# Patient Record
Sex: MALE | Race: WHITE | Employment: OTHER | URBAN - METROPOLITAN AREA
[De-identification: names, ages, dates, MRNs, and addresses within clinical notes are randomized per-mention and may not be internally consistent; named-entity substitution may affect disease eponyms.]

---

## 2018-08-03 ENCOUNTER — HOSPITAL ENCOUNTER (EMERGENCY)
Age: 83
Discharge: HOME | End: 2018-08-03
Payer: MEDICARE

## 2018-08-03 ENCOUNTER — APPOINTMENT (OUTPATIENT)
Dept: GENERAL RADIOLOGY | Age: 83
End: 2018-08-03
Payer: MEDICARE

## 2018-08-03 VITALS
BODY MASS INDEX: 25.83 KG/M2 | OXYGEN SATURATION: 94 % | DIASTOLIC BLOOD PRESSURE: 75 MMHG | SYSTOLIC BLOOD PRESSURE: 188 MMHG | HEART RATE: 65 BPM | RESPIRATION RATE: 16 BRPM | WEIGHT: 180 LBS | TEMPERATURE: 98 F

## 2018-08-03 DIAGNOSIS — S52.502A CLOSED FRACTURE OF DISTAL END OF LEFT RADIUS, UNSPECIFIED FRACTURE MORPHOLOGY, INITIAL ENCOUNTER: Primary | ICD-10-CM

## 2018-08-03 DIAGNOSIS — M25.532 PAIN AND SWELLING OF LEFT WRIST: ICD-10-CM

## 2018-08-03 DIAGNOSIS — M25.432 PAIN AND SWELLING OF LEFT WRIST: ICD-10-CM

## 2018-08-03 PROCEDURE — 99283 EMERGENCY DEPT VISIT LOW MDM: CPT

## 2018-08-03 PROCEDURE — 6370000000 HC RX 637 (ALT 250 FOR IP): Performed by: PHYSICIAN ASSISTANT

## 2018-08-03 PROCEDURE — 73110 X-RAY EXAM OF WRIST: CPT

## 2018-08-03 PROCEDURE — 4500000023 HC ED LEVEL 3 PROCEDURE

## 2018-08-03 RX ORDER — ACETAMINOPHEN 325 MG/1
650 TABLET ORAL ONCE
Status: COMPLETED | OUTPATIENT
Start: 2018-08-03 | End: 2018-08-03

## 2018-08-03 RX ORDER — HYDROCODONE BITARTRATE AND ACETAMINOPHEN 5; 325 MG/1; MG/1
1 TABLET ORAL EVERY 6 HOURS PRN
Qty: 10 TABLET | Refills: 0 | Status: SHIPPED | OUTPATIENT
Start: 2018-08-03 | End: 2018-08-10

## 2018-08-03 RX ADMIN — ACETAMINOPHEN 650 MG: 325 TABLET, FILM COATED ORAL at 10:37

## 2018-08-03 ASSESSMENT — PAIN SCALES - GENERAL
PAINLEVEL_OUTOF10: 7
PAINLEVEL_OUTOF10: 7

## 2018-08-03 NOTE — ED NOTES
Pt has been seen and assessed per [facility] .       Donnie Parr RN  08/03/18 1716

## 2018-08-03 NOTE — ED PROVIDER NOTES
(!) 188/75, pulse 65, temperature 98 °F (36.7 °C), temperature source Oral, resp. rate 16, weight 180 lb (81.6 kg), SpO2 94 %. PATIENT REFERRED TO:  Kashif Campuzano MD  [place] [address]    Schedule an appointment as soon as possible for a visit           DISPOSITION  Patient was discharged to home in good condition.           Gila Wellingtonma  08/03/18 1021

## 2018-08-23 DIAGNOSIS — M25.532 LEFT WRIST PAIN: Primary | ICD-10-CM

## 2018-09-13 DIAGNOSIS — M25.532 LEFT WRIST PAIN: Primary | ICD-10-CM

## 2021-03-31 ENCOUNTER — APPOINTMENT (OUTPATIENT)
Dept: CT IMAGING | Age: 86
DRG: 871 | End: 2021-03-31
Payer: MEDICARE

## 2021-03-31 ENCOUNTER — APPOINTMENT (OUTPATIENT)
Dept: GENERAL RADIOLOGY | Age: 86
DRG: 871 | End: 2021-03-31
Payer: MEDICARE

## 2021-03-31 ENCOUNTER — HOSPITAL ENCOUNTER (INPATIENT)
Age: 86
LOS: 6 days | Discharge: HOSPICE | DRG: 871 | End: 2021-04-06
Attending: EMERGENCY MEDICINE | Admitting: INTERNAL MEDICINE
Payer: MEDICARE
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
## 2021-03-31 DIAGNOSIS — R79.89 ELEVATED BRAIN NATRIURETIC PEPTIDE (BNP) LEVEL: ICD-10-CM

## 2021-03-31 DIAGNOSIS — R77.8 ELEVATED TROPONIN: ICD-10-CM

## 2021-03-31 DIAGNOSIS — J96.01 ACUTE RESPIRATORY FAILURE WITH HYPOXIA (HCC): Primary | ICD-10-CM

## 2021-03-31 DIAGNOSIS — N17.9 AKI (ACUTE KIDNEY INJURY) (HCC): ICD-10-CM

## 2021-03-31 PROBLEM — I34.0 SEVERE MITRAL REGURGITATION: Status: ACTIVE | Noted: 2021-03-31

## 2021-03-31 PROBLEM — I50.23 ACUTE ON CHRONIC SYSTOLIC HEART FAILURE (HCC): Status: ACTIVE | Noted: 2021-03-31

## 2021-03-31 PROBLEM — I48.20 CHRONIC ATRIAL FIBRILLATION (HCC): Status: ACTIVE | Noted: 2021-03-31

## 2021-03-31 PROBLEM — I50.22 CHRONIC SYSTOLIC (CONGESTIVE) HEART FAILURE (HCC): Status: ACTIVE | Noted: 2021-03-31

## 2021-03-31 LAB
A/G RATIO: 1.3 (ref 1.1–2.2)
ALBUMIN SERPL-MCNC: 3.4 G/DL (ref 3.4–5)
ALP BLD-CCNC: 237 U/L (ref 40–129)
ALT SERPL-CCNC: 37 U/L (ref 10–40)
ANION GAP SERPL CALCULATED.3IONS-SCNC: 12 MMOL/L (ref 3–16)
AST SERPL-CCNC: 46 U/L (ref 15–37)
BASE EXCESS VENOUS: -3.3 MMOL/L (ref -3–3)
BASOPHILS ABSOLUTE: 0.1 K/UL (ref 0–0.2)
BASOPHILS RELATIVE PERCENT: 0.4 %
BILIRUB SERPL-MCNC: 2.9 MG/DL (ref 0–1)
BUN BLDV-MCNC: 52 MG/DL (ref 7–20)
CALCIUM SERPL-MCNC: 8.5 MG/DL (ref 8.3–10.6)
CARBOXYHEMOGLOBIN: 1.6 % (ref 0–1.5)
CHLORIDE BLD-SCNC: 105 MMOL/L (ref 99–110)
CO2: 20 MMOL/L (ref 21–32)
CREAT SERPL-MCNC: 2.3 MG/DL (ref 0.8–1.3)
EOSINOPHILS ABSOLUTE: 0 K/UL (ref 0–0.6)
EOSINOPHILS RELATIVE PERCENT: 0.1 %
GFR AFRICAN AMERICAN: 33
GFR NON-AFRICAN AMERICAN: 27
GLOBULIN: 2.7 G/DL
GLUCOSE BLD-MCNC: 113 MG/DL (ref 70–99)
HCO3 VENOUS: 20.4 MMOL/L (ref 23–29)
HCT VFR BLD CALC: 44.3 % (ref 40.5–52.5)
HEMOGLOBIN: 15 G/DL (ref 13.5–17.5)
INR BLD: 4.08 (ref 0.86–1.14)
LACTIC ACID: 2 MMOL/L (ref 0.4–2)
LYMPHOCYTES ABSOLUTE: 1.2 K/UL (ref 1–5.1)
LYMPHOCYTES RELATIVE PERCENT: 8.2 %
MCH RBC QN AUTO: 35.3 PG (ref 26–34)
MCHC RBC AUTO-ENTMCNC: 33.9 G/DL (ref 31–36)
MCV RBC AUTO: 104.2 FL (ref 80–100)
METHEMOGLOBIN VENOUS: 0.3 %
MONOCYTES ABSOLUTE: 1.5 K/UL (ref 0–1.3)
MONOCYTES RELATIVE PERCENT: 10.2 %
NEUTROPHILS ABSOLUTE: 11.7 K/UL (ref 1.7–7.7)
NEUTROPHILS RELATIVE PERCENT: 81.1 %
O2 CONTENT, VEN: 16 VOL %
O2 SAT, VEN: 70 %
O2 THERAPY: ABNORMAL
PCO2, VEN: 33.5 MMHG (ref 40–50)
PDW BLD-RTO: 14.3 % (ref 12.4–15.4)
PH VENOUS: 7.4 (ref 7.35–7.45)
PLATELET # BLD: 199 K/UL (ref 135–450)
PMV BLD AUTO: 9.6 FL (ref 5–10.5)
PO2, VEN: 36.3 MMHG (ref 25–40)
POTASSIUM SERPL-SCNC: 4.6 MMOL/L (ref 3.5–5.1)
PRO-BNP: ABNORMAL PG/ML (ref 0–449)
PROCALCITONIN: 0.47 NG/ML (ref 0–0.15)
PROTHROMBIN TIME: 48 SEC (ref 10–13.2)
RBC # BLD: 4.25 M/UL (ref 4.2–5.9)
SARS-COV-2, NAAT: NOT DETECTED
SODIUM BLD-SCNC: 137 MMOL/L (ref 136–145)
TCO2 CALC VENOUS: 22 MMOL/L
TOTAL CK: 188 U/L (ref 39–308)
TOTAL PROTEIN: 6.1 G/DL (ref 6.4–8.2)
TROPONIN: 0.04 NG/ML
WBC # BLD: 14.4 K/UL (ref 4–11)

## 2021-03-31 PROCEDURE — 96368 THER/DIAG CONCURRENT INF: CPT

## 2021-03-31 PROCEDURE — 96365 THER/PROPH/DIAG IV INF INIT: CPT

## 2021-03-31 PROCEDURE — 99285 EMERGENCY DEPT VISIT HI MDM: CPT

## 2021-03-31 PROCEDURE — 87635 SARS-COV-2 COVID-19 AMP PRB: CPT

## 2021-03-31 PROCEDURE — 83605 ASSAY OF LACTIC ACID: CPT

## 2021-03-31 PROCEDURE — 2580000003 HC RX 258: Performed by: EMERGENCY MEDICINE

## 2021-03-31 PROCEDURE — 82803 BLOOD GASES ANY COMBINATION: CPT

## 2021-03-31 PROCEDURE — 85025 COMPLETE CBC W/AUTO DIFF WBC: CPT

## 2021-03-31 PROCEDURE — 36415 COLL VENOUS BLD VENIPUNCTURE: CPT

## 2021-03-31 PROCEDURE — 82550 ASSAY OF CK (CPK): CPT

## 2021-03-31 PROCEDURE — 74176 CT ABD & PELVIS W/O CONTRAST: CPT

## 2021-03-31 PROCEDURE — 83880 ASSAY OF NATRIURETIC PEPTIDE: CPT

## 2021-03-31 PROCEDURE — 85610 PROTHROMBIN TIME: CPT

## 2021-03-31 PROCEDURE — 93005 ELECTROCARDIOGRAM TRACING: CPT | Performed by: EMERGENCY MEDICINE

## 2021-03-31 PROCEDURE — 94761 N-INVAS EAR/PLS OXIMETRY MLT: CPT

## 2021-03-31 PROCEDURE — 84145 PROCALCITONIN (PCT): CPT

## 2021-03-31 PROCEDURE — 80053 COMPREHEN METABOLIC PANEL: CPT

## 2021-03-31 PROCEDURE — 6360000002 HC RX W HCPCS: Performed by: EMERGENCY MEDICINE

## 2021-03-31 PROCEDURE — 87040 BLOOD CULTURE FOR BACTERIA: CPT

## 2021-03-31 PROCEDURE — 2700000000 HC OXYGEN THERAPY PER DAY

## 2021-03-31 PROCEDURE — 84484 ASSAY OF TROPONIN QUANT: CPT

## 2021-03-31 PROCEDURE — 2060000000 HC ICU INTERMEDIATE R&B

## 2021-03-31 PROCEDURE — 71045 X-RAY EXAM CHEST 1 VIEW: CPT

## 2021-03-31 RX ORDER — FUROSEMIDE 10 MG/ML
40 INJECTION INTRAMUSCULAR; INTRAVENOUS 2 TIMES DAILY
Status: DISCONTINUED | OUTPATIENT
Start: 2021-03-31 | End: 2021-04-01

## 2021-03-31 RX ORDER — SODIUM CHLORIDE 9 MG/ML
1000 INJECTION, SOLUTION INTRAVENOUS CONTINUOUS
Status: DISCONTINUED | OUTPATIENT
Start: 2021-03-31 | End: 2021-04-01

## 2021-03-31 RX ADMIN — SODIUM CHLORIDE 1000 ML: 9 INJECTION, SOLUTION INTRAVENOUS at 23:34

## 2021-03-31 RX ADMIN — CEFEPIME HYDROCHLORIDE 1000 MG: 1 INJECTION, POWDER, FOR SOLUTION INTRAMUSCULAR; INTRAVENOUS at 23:03

## 2021-03-31 RX ADMIN — VANCOMYCIN HYDROCHLORIDE 1500 MG: 10 INJECTION, POWDER, LYOPHILIZED, FOR SOLUTION INTRAVENOUS at 23:33

## 2021-03-31 RX ADMIN — SODIUM CHLORIDE 1000 ML: 9 INJECTION, SOLUTION INTRAVENOUS at 22:07

## 2021-03-31 RX ADMIN — SODIUM CHLORIDE 1000 ML: 9 INJECTION, SOLUTION INTRAVENOUS at 20:55

## 2021-03-31 ASSESSMENT — PAIN DESCRIPTION - LOCATION: LOCATION: HIP

## 2021-03-31 ASSESSMENT — PAIN DESCRIPTION - ORIENTATION: ORIENTATION: LEFT

## 2021-03-31 ASSESSMENT — PAIN SCALES - GENERAL: PAINLEVEL_OUTOF10: 7

## 2021-03-31 ASSESSMENT — PAIN DESCRIPTION - PROGRESSION: CLINICAL_PROGRESSION: NOT CHANGED

## 2021-04-01 PROBLEM — E05.90 HYPERTHYROIDISM: Status: ACTIVE | Noted: 2021-04-01

## 2021-04-01 PROBLEM — I44.7 LBBB (LEFT BUNDLE BRANCH BLOCK): Status: ACTIVE | Noted: 2021-04-01

## 2021-04-01 PROBLEM — R79.1 SUPRATHERAPEUTIC INR: Status: ACTIVE | Noted: 2021-04-01

## 2021-04-01 PROBLEM — J96.01 ACUTE RESPIRATORY FAILURE WITH HYPOXIA (HCC): Status: ACTIVE | Noted: 2021-04-01

## 2021-04-01 PROBLEM — Z79.01 CHRONIC ANTICOAGULATION: Status: ACTIVE | Noted: 2021-04-01

## 2021-04-01 PROBLEM — E80.6 HYPERBILIRUBINEMIA: Status: ACTIVE | Noted: 2021-04-01

## 2021-04-01 LAB
A/G RATIO: 1.1 (ref 1.1–2.2)
ALBUMIN SERPL-MCNC: 3.4 G/DL (ref 3.4–5)
ALP BLD-CCNC: 225 U/L (ref 40–129)
ALT SERPL-CCNC: 37 U/L (ref 10–40)
ANION GAP SERPL CALCULATED.3IONS-SCNC: 15 MMOL/L (ref 3–16)
AST SERPL-CCNC: 48 U/L (ref 15–37)
BASE EXCESS ARTERIAL: -6.2 MMOL/L (ref -3–3)
BASE EXCESS ARTERIAL: -7.7 MMOL/L (ref -3–3)
BASOPHILS ABSOLUTE: 0 K/UL (ref 0–0.2)
BASOPHILS RELATIVE PERCENT: 0.3 %
BILIRUB SERPL-MCNC: 2.7 MG/DL (ref 0–1)
BUN BLDV-MCNC: 55 MG/DL (ref 7–20)
CALCIUM SERPL-MCNC: 8.7 MG/DL (ref 8.3–10.6)
CARBOXYHEMOGLOBIN ARTERIAL: 0.3 % (ref 0–1.5)
CARBOXYHEMOGLOBIN ARTERIAL: 0.3 % (ref 0–1.5)
CHLORIDE BLD-SCNC: 106 MMOL/L (ref 99–110)
CO2: 18 MMOL/L (ref 21–32)
CREAT SERPL-MCNC: 2.3 MG/DL (ref 0.8–1.3)
EKG ATRIAL RATE: 133 BPM
EKG DIAGNOSIS: NORMAL
EKG Q-T INTERVAL: 436 MS
EKG QRS DURATION: 176 MS
EKG QTC CALCULATION (BAZETT): 576 MS
EKG R AXIS: 8 DEGREES
EKG T AXIS: 206 DEGREES
EKG VENTRICULAR RATE: 105 BPM
EOSINOPHILS ABSOLUTE: 0 K/UL (ref 0–0.6)
EOSINOPHILS RELATIVE PERCENT: 0.1 %
FERRITIN: 920.5 NG/ML (ref 30–400)
FOLATE: 10.83 NG/ML (ref 4.78–24.2)
GFR AFRICAN AMERICAN: 33
GFR NON-AFRICAN AMERICAN: 27
GLOBULIN: 3.1 G/DL
GLUCOSE BLD-MCNC: 100 MG/DL (ref 70–99)
HCO3 ARTERIAL: 15.4 MMOL/L (ref 21–29)
HCO3 ARTERIAL: 16.8 MMOL/L (ref 21–29)
HCT VFR BLD CALC: 44.7 % (ref 40.5–52.5)
HEMOGLOBIN, ART, EXTENDED: 15.9 G/DL (ref 13.5–17.5)
HEMOGLOBIN, ART, EXTENDED: 18.6 G/DL (ref 13.5–17.5)
HEMOGLOBIN: 14.8 G/DL (ref 13.5–17.5)
IMMATURE RETIC FRACT: 0.5 (ref 0.21–0.37)
INR BLD: 4.21 (ref 0.86–1.14)
IRON SATURATION: 32 % (ref 20–50)
IRON: 52 UG/DL (ref 59–158)
LACTATE DEHYDROGENASE: 350 U/L (ref 100–190)
LYMPHOCYTES ABSOLUTE: 0.9 K/UL (ref 1–5.1)
LYMPHOCYTES RELATIVE PERCENT: 6.7 %
MAGNESIUM: 2.6 MG/DL (ref 1.8–2.4)
MCH RBC QN AUTO: 34.8 PG (ref 26–34)
MCHC RBC AUTO-ENTMCNC: 33 G/DL (ref 31–36)
MCV RBC AUTO: 105.5 FL (ref 80–100)
METHEMOGLOBIN ARTERIAL: 0.4 %
METHEMOGLOBIN ARTERIAL: 0.7 %
MONOCYTES ABSOLUTE: 1.2 K/UL (ref 0–1.3)
MONOCYTES RELATIVE PERCENT: 8.9 %
NEUTROPHILS ABSOLUTE: 11.5 K/UL (ref 1.7–7.7)
NEUTROPHILS RELATIVE PERCENT: 84 %
O2 CONTENT ARTERIAL: 21 ML/DL
O2 CONTENT ARTERIAL: 25 ML/DL
O2 SAT, ARTERIAL: 94.8 %
O2 SAT, ARTERIAL: 95.7 %
O2 THERAPY: ABNORMAL
O2 THERAPY: ABNORMAL
PCO2 ARTERIAL: 26.2 MMHG (ref 35–45)
PCO2 ARTERIAL: 28.4 MMHG (ref 35–45)
PDW BLD-RTO: 14.2 % (ref 12.4–15.4)
PH ARTERIAL: 7.39 (ref 7.35–7.45)
PH ARTERIAL: 7.39 (ref 7.35–7.45)
PLATELET # BLD: 202 K/UL (ref 135–450)
PMV BLD AUTO: 9.5 FL (ref 5–10.5)
PO2 ARTERIAL: 77.5 MMHG (ref 75–108)
PO2 ARTERIAL: 80.5 MMHG (ref 75–108)
POTASSIUM SERPL-SCNC: 4.6 MMOL/L (ref 3.5–5.1)
PROCALCITONIN: 0.56 NG/ML (ref 0–0.15)
PROCALCITONIN: 0.65 NG/ML (ref 0–0.15)
PROTHROMBIN TIME: 49.6 SEC (ref 10–13.2)
RBC # BLD: 4.24 M/UL (ref 4.2–5.9)
RETICULOCYTE ABSOLUTE COUNT: 0.04 M/UL
RETICULOCYTE COUNT PCT: 0.98 % (ref 0.5–2.18)
SARS-COV-2: NOT DETECTED
SODIUM BLD-SCNC: 139 MMOL/L (ref 136–145)
T4 FREE: 2 NG/DL (ref 0.9–1.8)
TCO2 ARTERIAL: 16.2 MMOL/L
TCO2 ARTERIAL: 17.6 MMOL/L
TOTAL IRON BINDING CAPACITY: 163 UG/DL (ref 260–445)
TOTAL PROTEIN: 6.5 G/DL (ref 6.4–8.2)
TROPONIN: 0.03 NG/ML
TROPONIN: 0.04 NG/ML
TROPONIN: 0.04 NG/ML
TSH REFLEX FT4: 1.38 UIU/ML (ref 0.27–4.2)
TSH SERPL DL<=0.05 MIU/L-ACNC: 1.41 UIU/ML (ref 0.27–4.2)
VITAMIN B-12: 1178 PG/ML (ref 211–911)
WBC # BLD: 13.7 K/UL (ref 4–11)

## 2021-04-01 PROCEDURE — 85025 COMPLETE CBC W/AUTO DIFF WBC: CPT

## 2021-04-01 PROCEDURE — 85045 AUTOMATED RETICULOCYTE COUNT: CPT

## 2021-04-01 PROCEDURE — 2580000003 HC RX 258: Performed by: INTERNAL MEDICINE

## 2021-04-01 PROCEDURE — 80053 COMPREHEN METABOLIC PANEL: CPT

## 2021-04-01 PROCEDURE — 99223 1ST HOSP IP/OBS HIGH 75: CPT | Performed by: INTERNAL MEDICINE

## 2021-04-01 PROCEDURE — 6360000002 HC RX W HCPCS: Performed by: INTERNAL MEDICINE

## 2021-04-01 PROCEDURE — 2500000003 HC RX 250 WO HCPCS: Performed by: INTERNAL MEDICINE

## 2021-04-01 PROCEDURE — 82728 ASSAY OF FERRITIN: CPT

## 2021-04-01 PROCEDURE — 82746 ASSAY OF FOLIC ACID SERUM: CPT

## 2021-04-01 PROCEDURE — 85610 PROTHROMBIN TIME: CPT

## 2021-04-01 PROCEDURE — 2700000000 HC OXYGEN THERAPY PER DAY

## 2021-04-01 PROCEDURE — 82607 VITAMIN B-12: CPT

## 2021-04-01 PROCEDURE — 36600 WITHDRAWAL OF ARTERIAL BLOOD: CPT

## 2021-04-01 PROCEDURE — 99291 CRITICAL CARE FIRST HOUR: CPT | Performed by: INTERNAL MEDICINE

## 2021-04-01 PROCEDURE — 84443 ASSAY THYROID STIM HORMONE: CPT

## 2021-04-01 PROCEDURE — U0005 INFEC AGEN DETEC AMPLI PROBE: HCPCS

## 2021-04-01 PROCEDURE — 84484 ASSAY OF TROPONIN QUANT: CPT

## 2021-04-01 PROCEDURE — 94761 N-INVAS EAR/PLS OXIMETRY MLT: CPT

## 2021-04-01 PROCEDURE — 94660 CPAP INITIATION&MGMT: CPT

## 2021-04-01 PROCEDURE — U0003 INFECTIOUS AGENT DETECTION BY NUCLEIC ACID (DNA OR RNA); SEVERE ACUTE RESPIRATORY SYNDROME CORONAVIRUS 2 (SARS-COV-2) (CORONAVIRUS DISEASE [COVID-19]), AMPLIFIED PROBE TECHNIQUE, MAKING USE OF HIGH THROUGHPUT TECHNOLOGIES AS DESCRIBED BY CMS-2020-01-R: HCPCS

## 2021-04-01 PROCEDURE — 83615 LACTATE (LD) (LDH) ENZYME: CPT

## 2021-04-01 PROCEDURE — 83540 ASSAY OF IRON: CPT

## 2021-04-01 PROCEDURE — 84439 ASSAY OF FREE THYROXINE: CPT

## 2021-04-01 PROCEDURE — 2060000000 HC ICU INTERMEDIATE R&B

## 2021-04-01 PROCEDURE — 36415 COLL VENOUS BLD VENIPUNCTURE: CPT

## 2021-04-01 PROCEDURE — 93005 ELECTROCARDIOGRAM TRACING: CPT | Performed by: NURSE PRACTITIONER

## 2021-04-01 PROCEDURE — 82803 BLOOD GASES ANY COMBINATION: CPT

## 2021-04-01 PROCEDURE — 84145 PROCALCITONIN (PCT): CPT

## 2021-04-01 PROCEDURE — 83550 IRON BINDING TEST: CPT

## 2021-04-01 PROCEDURE — 93010 ELECTROCARDIOGRAM REPORT: CPT | Performed by: INTERNAL MEDICINE

## 2021-04-01 PROCEDURE — 83735 ASSAY OF MAGNESIUM: CPT

## 2021-04-01 RX ORDER — SODIUM CHLORIDE 0.9 % (FLUSH) 0.9 %
10 SYRINGE (ML) INJECTION PRN
Status: DISCONTINUED | OUTPATIENT
Start: 2021-04-01 | End: 2021-04-06 | Stop reason: HOSPADM

## 2021-04-01 RX ORDER — AMIODARONE HYDROCHLORIDE 200 MG/1
200 TABLET ORAL DAILY
Status: DISCONTINUED | OUTPATIENT
Start: 2021-04-01 | End: 2021-04-04

## 2021-04-01 RX ORDER — CALCITRIOL 0.25 UG/1
0.25 CAPSULE, LIQUID FILLED ORAL DAILY
Status: DISCONTINUED | OUTPATIENT
Start: 2021-04-01 | End: 2021-04-06 | Stop reason: HOSPADM

## 2021-04-01 RX ORDER — ACETAMINOPHEN 325 MG/1
650 TABLET ORAL EVERY 6 HOURS PRN
Status: DISCONTINUED | OUTPATIENT
Start: 2021-04-01 | End: 2021-04-06 | Stop reason: HOSPADM

## 2021-04-01 RX ORDER — WARFARIN SODIUM 2.5 MG/1
2.5 TABLET ORAL DAILY
Status: DISCONTINUED | OUTPATIENT
Start: 2021-04-01 | End: 2021-04-01

## 2021-04-01 RX ORDER — PROMETHAZINE HYDROCHLORIDE 25 MG/1
12.5 TABLET ORAL EVERY 6 HOURS PRN
Status: DISCONTINUED | OUTPATIENT
Start: 2021-04-01 | End: 2021-04-05 | Stop reason: ALTCHOICE

## 2021-04-01 RX ORDER — SODIUM CHLORIDE 9 MG/ML
25 INJECTION, SOLUTION INTRAVENOUS PRN
Status: DISCONTINUED | OUTPATIENT
Start: 2021-04-01 | End: 2021-04-06 | Stop reason: HOSPADM

## 2021-04-01 RX ORDER — METHIMAZOLE 5 MG/1
10 TABLET ORAL 3 TIMES DAILY
Status: DISCONTINUED | OUTPATIENT
Start: 2021-04-01 | End: 2021-04-06 | Stop reason: HOSPADM

## 2021-04-01 RX ORDER — ACETAMINOPHEN 650 MG/1
650 SUPPOSITORY RECTAL EVERY 6 HOURS PRN
Status: DISCONTINUED | OUTPATIENT
Start: 2021-04-01 | End: 2021-04-06 | Stop reason: HOSPADM

## 2021-04-01 RX ORDER — ONDANSETRON 2 MG/ML
4 INJECTION INTRAMUSCULAR; INTRAVENOUS EVERY 6 HOURS PRN
Status: DISCONTINUED | OUTPATIENT
Start: 2021-04-01 | End: 2021-04-05 | Stop reason: ALTCHOICE

## 2021-04-01 RX ORDER — ATORVASTATIN CALCIUM 10 MG/1
20 TABLET, FILM COATED ORAL DAILY
Status: DISCONTINUED | OUTPATIENT
Start: 2021-04-01 | End: 2021-04-06 | Stop reason: HOSPADM

## 2021-04-01 RX ORDER — LISINOPRIL 20 MG/1
20 TABLET ORAL EVERY EVENING
Status: CANCELLED | OUTPATIENT
Start: 2021-04-02

## 2021-04-01 RX ORDER — LANOLIN ALCOHOL/MO/W.PET/CERES
3 CREAM (GRAM) TOPICAL NIGHTLY PRN
Status: DISCONTINUED | OUTPATIENT
Start: 2021-04-01 | End: 2021-04-06 | Stop reason: HOSPADM

## 2021-04-01 RX ORDER — CHLOROTHIAZIDE SODIUM 500 MG/1
500 INJECTION INTRAVENOUS ONCE
Status: COMPLETED | OUTPATIENT
Start: 2021-04-01 | End: 2021-04-01

## 2021-04-01 RX ADMIN — DILTIAZEM HYDROCHLORIDE 5 MG/HR: 5 INJECTION INTRAVENOUS at 10:49

## 2021-04-01 RX ADMIN — FUROSEMIDE 40 MG: 10 INJECTION, SOLUTION INTRAMUSCULAR; INTRAVENOUS at 00:00

## 2021-04-01 RX ADMIN — FUROSEMIDE 40 MG: 10 INJECTION, SOLUTION INTRAMUSCULAR; INTRAVENOUS at 08:24

## 2021-04-01 RX ADMIN — FUROSEMIDE 10 MG/HR: 10 INJECTION, SOLUTION INTRAMUSCULAR; INTRAVENOUS at 21:20

## 2021-04-01 RX ADMIN — CEFTRIAXONE SODIUM 1000 MG: 1 INJECTION, POWDER, FOR SOLUTION INTRAMUSCULAR; INTRAVENOUS at 08:24

## 2021-04-01 RX ADMIN — CHLOROTHIAZIDE SODIUM 500 MG: 500 INJECTION, POWDER, LYOPHILIZED, FOR SOLUTION INTRAVENOUS at 17:15

## 2021-04-01 RX ADMIN — FUROSEMIDE 10 MG/HR: 10 INJECTION, SOLUTION INTRAMUSCULAR; INTRAVENOUS at 10:50

## 2021-04-01 RX ADMIN — AZITHROMYCIN MONOHYDRATE 500 MG: 500 INJECTION, POWDER, LYOPHILIZED, FOR SOLUTION INTRAVENOUS at 09:29

## 2021-04-01 ASSESSMENT — ENCOUNTER SYMPTOMS
NAUSEA: 0
DIARRHEA: 0
PHOTOPHOBIA: 0
WHEEZING: 0
COUGH: 0
COLOR CHANGE: 0
RHINORRHEA: 0
VOMITING: 0
BACK PAIN: 0
SHORTNESS OF BREATH: 1
CHEST TIGHTNESS: 0
ABDOMINAL PAIN: 0

## 2021-04-01 ASSESSMENT — PAIN SCALES - GENERAL
PAINLEVEL_OUTOF10: 0
PAINLEVEL_OUTOF10: 0

## 2021-04-01 NOTE — DISCHARGE INSTR - COC
disoriented    IV Access:  - None    Nursing Mobility/ADLs:  Walking   Dependent  Transfer  Dependent  Bathing  Dependent  Dressing  Dependent  Toileting  Dependent  Feeding  Dependent  Med Admin  Dependent  Med Delivery   none    Wound Care Documentation and Therapy:  Incision 01/31/14 Abdomen Anterior (Active)   Number of days: 2616        Elimination:  Continence:   · Bowel: No  · Bladder: No  Urinary Catheter: Insertion Date: 3/31/21   Colostomy/Ileostomy/Ileal Conduit: No       Date of Last BM: 4/5/21  No intake or output data in the 24 hours ending 03/31/21 2231  No intake/output data recorded. Safety Concerns: At Risk for Falls and Aspiration Risk    Impairments/Disabilities:      None    Nutrition Therapy:  Current Nutrition Therapy:   - Oral Diet:  NPO    Routes of Feeding: None  Liquids: No Liquids  Daily Fluid Restriction: no  Last Modified Barium Swallow with Video (Video Swallowing Test): not done    Treatments at the Time of Hospital Discharge:   Respiratory Treatments:   Oxygen Therapy:  is on oxygen at 15 L/min per nasal cannula.   Ventilator:    - No ventilator support    Rehab Therapies: Physical Therapy and Occupational Therapy  Weight Bearing Status/Restrictions: No weight bearing restirctions  Other Medical Equipment (for information only, NOT a DME order)2  Other Treatments:     Patient's personal belongings (please select all that are sent with patient):  None    RN SIGNATURE:  Electronically signed by Stephanie Garcia RN on 4/6/21 at 3:28 PM EDT    CASE MANAGEMENT/SOCIAL WORK SECTION    Inpatient Status Date: ***    Readmission Risk Assessment Score:  Readmission Risk              Risk of Unplanned Readmission:        0           Discharging to Facility/ Agency   · Name:   · Address:  · Phone:  · Fax:    Dialysis Facility (if applicable)   · Name:  · Address:  · Dialysis Schedule:  · Phone:  · Fax:    / signature: {Esignature:[id number]}    PHYSICIAN SECTION    Prognosis: Poor    Condition at Discharge: Terminal    Rehab Potential (if transferring to Rehab): Poor    Recommended Labs or Other Treatments After Discharge: Continue comfort medication morphine sublingual, Ativan sublingual, oxygen as needed, Tylenol suppositories as needed    Physician Certification: I certify the above information and transfer of Rebeka Reveles  is necessary for the continuing treatment of the diagnosis listed and that he requires Hospice for less 30 days.      Update Admission H&P: No change in H&P    PHYSICIAN SIGNATURE:  Electronically signed by Kya Hooper MD on 4/6/21 at 2:07 PM EDT

## 2021-04-01 NOTE — PROGRESS NOTES
Hospitalist Progress Note      PCP: Kirsten Givens MD    Date of Admission: 3/31/2021    Chief Complaint: Fall, dyspnea    Hospital Course:   Dario Arambula is a 80 y.o. male. He presents from home via ambulance. Upon arrival he relates he activated EMS because he fell this morning. This is his only complaint. He denies loss of consciousness and injury.     Patient has a longstanding history of severe LV systolic dysfunction, severe mitral insufficiency, and chronic atrial fibrillation for which he is anticoagulated on warfarin. He has followed up with cardiologist, Dr. Ruth Hammond, through [facility] since 2017. He has declined any interventions such as mitral annuloplasty.     Tonight I asked the patient about his chronic medical problems and he was completely unaware of any heart failure or valvular disease. He does not remember declining operative intervention either. He knows he sees Dr. Ruth Hammond. He does know he takes warfarin for atrial fibrillation. He is prescribed furosemide to take on a prn basis and he replies that he's never heard of the drug. I spoke with his daughter later and she related he had a bottle of it in his medicine cabinet.     Denies loss of appetite, rigors, sweats, nausea, and diarrhea. Denies dyspnea, orthopnea, PND. He denies chest pressure. He does relate he has not urinated all day.     Has also says he has received two doses of a coronavirus vaccine.     I spoke with the patient's daughter, Arely Mccauley. Her number is [phone]. She relates that normally his memory is quite clear and is surprised to hear that he can't remember his own medical history or medications. We also discussed code status (see separate note).       Subjective: Shortness of breath he is on BiPAP and struggling. His blood pressure was low this morning.   He is alert oriented able to talk and obeys simple commands  Has cough with white sputum  Denies chest pain abdominal pain nausea vomiting or diarrhea      Medications:  Reviewed    Infusion Medications    sodium chloride       Scheduled Medications    amiodarone  200 mg Oral Daily    atorvastatin  20 mg Oral Daily    methIMAzole  10 mg Oral TID    calcitRIOL  0.25 mcg Oral Daily    psyllium  1 packet Oral Daily    cefTRIAXone (ROCEPHIN) IV  1,000 mg Intravenous Q24H    azithromycin  500 mg Intravenous Q24H    furosemide  40 mg Intravenous BID     PRN Meds: sodium chloride flush, sodium chloride, promethazine **OR** ondansetron, acetaminophen **OR** acetaminophen, melatonin      Intake/Output Summary (Last 24 hours) at 4/1/2021 0802  Last data filed at 4/1/2021 0424  Gross per 24 hour   Intake 100 ml   Output 650 ml   Net -550 ml       Physical Exam Performed:    BP 84/61   Pulse 126   Temp 97.9 °F (36.6 °C) (Axillary)   Resp (!) 37   Ht 5' 8\" (1.727 m)   Wt 180 lb 9.6 oz (81.9 kg)   SpO2 94%   BMI 27.46 kg/m²     General appearance: Mild respiratory distress, appears stated age and cooperative. On BiPAP  HEENT: Pupils equal, round, and reactive to light. Conjunctivae/corneas clear. Neck: Supple, with full range of motion. No jugular venous distention. Trachea midline. Respiratory: On BiPAP. Bilateral crackles, bilaterally without Rales/Wheezes/Rhonchi. Cardiovascular: Regular rate and rhythm with normal S1/S2 with murmurs, rubs or gallops. Abdomen: Soft, non-tender, non-distended with normal bowel sounds. Musculoskeletal: No clubbing, cyanosis , has 2+ edema bilaterally. Full range of motion without deformity. Skin: Skin color, texture, turgor normal.  No rashes or lesions. Neurologic:  Neurovascularly intact without any focal sensory/motor deficits.  .  Psychiatric: Alert and oriented,   Capillary Refill: Brisk,< 3 seconds   Peripheral Pulses: +2 palpable, equal bilaterally       Labs:   Recent Labs     03/31/21 2047 04/01/21  0533   WBC 14.4* 13.7*   HGB 15.0 14.8   HCT 44.3 44.7    202     Recent Labs 03/31/21 2047 04/01/21  0533    139   K 4.6 4.6    106   CO2 20* 18*   BUN 52* 55*   CREATININE 2.3* 2.3*   CALCIUM 8.5 8.7     Recent Labs     03/31/21 2047 04/01/21  0533   AST 46* 48*   ALT 37 37   BILITOT 2.9* 2.7*   ALKPHOS 237* 225*     Recent Labs     03/31/21 2047 04/01/21  0533   INR 4.08* 4.21*     Recent Labs     03/31/21 2047   CKTOTAL 188   TROPONINI 0.04*       Urinalysis:      Lab Results   Component Value Date    NITRU Negative 11/24/2014    WBCUA 0-2 11/24/2014    BACTERIA 1+ 11/24/2014    RBCUA None seen 11/24/2014    BLOODU Negative 11/24/2014    SPECGRAV 1.020 11/24/2014    GLUCOSEU Negative 11/24/2014    GLUCOSEU NEGATIVE 09/02/2011       Radiology:  CT CHEST ABDOMEN PELVIS WO CONTRAST   Preliminary Result   1. No evidence of acute traumatic injury in the chest, abdomen or pelvis. 2. Extensive bilateral ground-glass opacities in both lungs. Imaging   features can be seen with COVID-19 pneumonia, though are nonspecific and can   occur with a variety of infectious and noninfectious processes. PneInd   3. Cardiomegaly with coronary artery disease. 4. Small bilateral pleural effusions. 5. Multinodular thyroid goiter with asymmetric enlargement of the left   thyroid and rightward shift of the trachea. Thyroid nodules measure up to   5.1 cm. Please see follow-up recommendations below. RECOMMENDATIONS:   5.1 cm incidental thyroid nodule with heterogeneous and enlarged thyroid. Recommend thyroid US if clinically warranted given patient age. Reference: J Am Renetta Radiol. 2015 Feb;12(2): 143-50         XR CHEST PORTABLE   Final Result   1. Right upper lobe airspace disease has the appearance of pneumonia, with   atelectasis or pneumonia in the right base and left upper lobe   2. Cardiomegaly with pulmonary vascular congestion but no definite findings   of pulmonary edema   3.  Large thyroid goiter                 Assessment/Plan:    Active Hospital Problems    Diagnosis    Hyperbilirubinemia [E80.6]    Acute respiratory failure with hypoxia (HCC) [J96.01]    Chronic anticoagulation [Z79.01]    Supratherapeutic INR [R79.1]    Hyperthyroidism [E05.90]    LBBB (left bundle branch block) [I44.7]    KIERSTEN (acute kidney injury) ([facility] [address] [N17.9]    Chronic atrial fibrillation (HCC) [I48.20]    Severe mitral regurgitation [I34.0]    Chronic systolic (congestive) heart failure (HCC) [I50.22]    Acute on chronic systolic heart failure (HCC) [I50.23]    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 It is prescribed prn. He cannot recall ever having taken this medication. Some degree of cognitive impairment seems to be laying a role is his current decompensation. -  on furosemide 40mg IV BID. Held entresto. -  Continue amiodarone and atorvastatin.  -Hold warfarin and pharmacy to dose. -  See outside echo report above. 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 Patient has previously declined any invasive interventions. Cardiology consult     Acute Respiratory failure, Possible pneumonia bacterial as well as Covid, and CHF  -On BiPAP  -Continue antibiotics,, ABG  Pulmonology evaluation. Covid PCR, droplet plus isolation, discussed with pulmonologist     KIERSTEN on CKD 3b  -  Baseline creatinine ~ 1.8. Currently 2.3.  -  I suspect urinary retention. Place ragland catheter.    -  Start furosemide 40mg IV BID. Held entresto.,  Nephrotoxic medication and nephrology evaluation, discussed with nephrologist     Hyperthyroidism  -  Caused by multinodular goiter plus amiodarone. -  Continue methimazole 10mg TID. Check TSH and FT4.     Hyperbilirubinemia  -  Associated w/ mild elevation of alkaline phosphatase as well. This was present on prior labs in 2014 but not on labs obtained since then at [facility].   He underwent cholecystectomy for acute cholecystitis in 2014. Bilirubin level returned to normal as recently as November, 2019.  -  Suspected congestive hepatopathy. No imaging findings to suggest biliary obstruction.     DVT Prophylaxis: Supratherapeutic INR  Diet: DIET CARDIAC;  Code Status: Full code    PT/OT Eval Status:     Dispo -pending improvement of respiratory failure and cardiac status    Patricia Chandler MD

## 2021-04-01 NOTE — ACP (ADVANCE CARE PLANNING)
ADVANCED CARE PLANNING - REMOTE     Name:Ronnie Null       :  1932              MRN:  [id number]  Admission Date: 3/31/2021  8:52 PM  Date of Discussion: 2021      Purpose of Encounter: Advanced care planning in light of respiratory failure  Parties in attendance: :Ovi Shirley MD, Family members: Son Sherri Oconnell Junior and patient  Decisional Capacity:Yes    Remote ACP visit was performed based on new provisions and guidance offered by [facility] on 2020 in setting of COVID 19 outbreak and in order to preserve personal protective equipment in accordance with the flexibilities announced by CMS on 2020. References:   https://[facility]. [place].[facility]/Portals/0/Resources/COVID-19/3_%20Telemed%20Guidance%20Updated%20March%2018. pdf?[email or web address]   http://[employer]/. pdf     Objective/Medical Story:  29-year-old male with a history of CAD CABG valvular disease atrial fibrillation on warfarin admitted with acute hypoxic respiratory failure secondary to acute chronic systolic heart failure, pneumonia, Covid cannot be ruled out. His respiratory status declined currently he is on BiPAP. Active Diagnoses:     Active Hospital Problems    Diagnosis Date Noted    Hyperbilirubinemia [E80.6] 2021    Acute respiratory failure with hypoxia (HCC) [J96.01] 2021    Chronic anticoagulation [Z79.01] 2021    Supratherapeutic INR [R79.1] 2021    Hyperthyroidism [E05.90] 2021    LBBB (left bundle branch block) [I44.7] 2021    KIERSTEN (acute kidney injury) ([facility] [address] [N17.9] 2021    Chronic atrial fibrillation (HCC) [I48.20] 2021    Severe mitral regurgitation [I34.0] 2021    Chronic systolic (congestive) heart failure (HCC) [I50.22] 2021    Acute on chronic systolic heart failure (HCC) [I50.23] 2021    Stage 3b chronic kidney disease [N18.32] 01/11/2014       These active diagnoses are of sufficient risk that focused discussion on advance care planning is indicated in order to allow the patient to thoughtfully consider personal goals of care; and if situations arise that prevent the ability to personally give input, to ensure appropriate representation of their personal desires for different levels and agressiveness of care. Goals of Care Determinations: Patient wishes to focus on aggressive treatment  Code Status: At this time patient wishes to be full code    Time Spent:     Total time spent face-to-face in education and discussion: 16 minutes. Electronically signed by Christopher Lawrence MD on 4/1/2021 at 12:02 PM    Thank you Malka David MD for the opportunity to be involved in this patient's care. If you have any questions or concerns please feel free to contact me at [phone].

## 2021-04-01 NOTE — ED NOTES
Writer called respiratory at this time to place patient on high flow o2 nasal cannula.      Fany Ball RN  03/31/21 9625

## 2021-04-01 NOTE — ED NOTES
Bed: 04  Expected date:   Expected time:   Means of arrival:   Comments:  Alvaro Jimenez [place]  03/31/21 2052

## 2021-04-01 NOTE — PROGRESS NOTES
04/01/21 1139   NIV Type   Skin Protection for O2 Device Yes   Equipment Type v60   Mode Bilevel   Mask Type Full face mask   Mask Size Medium   Settings/Measurements   IPAP 12 cmH20   CPAP/EPAP 6 cmH2O   Rate Ordered 10   Resp (!) 41   FiO2  60 %   Vt Exhaled 885 mL   Mask Leak (lpm) 0 lpm   Comfort Level Good   Using Accessory Muscles No   SpO2 94

## 2021-04-01 NOTE — CONSULTS
[facility]  Consult Note           Reason for Consult:  Acute, chronic kidney disease  Requesting Physician:  Dr. Miguel Wynn    Chief Complaint:  Shortness of breath, fall  History Obtained From:  patient, electronic medical record    History of Present Ilness:    80year old male with sCHF, Afib, CKD-4, HTN admitted with shortness of breath, fall. We have been asked to assist in further mgmt of his A/CKD. SCr 2.3 on admission, previously 1.8 from Jan 2021. Had fall yesterday morning, found to be in CHF requiring bi-pap, diuresis. Urine output of ~750 ml since admission. No reported chest pain, abdominal pain, or fevers. Past Medical History:        Diagnosis Date    Allergic rhinitis     Atrial fibrillation (HCC)     CHF (congestive heart failure) (HCC)     Chronic kidney disease, stage III (moderate)     Hyperlipidemia     Hypertension     Macular degeneration     Thyroid disease     Vitamin D deficiency        Past Surgical History:        Procedure Laterality Date    CARDIOVERSION  2006    atrial fib    COLONOSCOPY  2013    EYE SURGERY  2000    archie iol    JOINT REPLACEMENT Left 2012    hip    JOINT REPLACEMENT Right 2004    hip       Home Medications:    No current facility-administered medications on file prior to encounter. Current Outpatient Medications on File Prior to Encounter   Medication Sig Dispense Refill    amiodarone (CORDARONE) 200 MG tablet Take 200 mg by mouth daily.  atorvastatin (LIPITOR) 20 MG tablet Take 20 mg by mouth daily.  warfarin (COUMADIN) 2.5 MG tablet Take 2.5 mg by mouth Daily. 5mg  2days      Misc Natural Products (OSTEO BI-FLEX ADV TRIPLE ST PO) Take  by mouth daily.  psyllium (KONSYL) 28.3 % PACK Take 1 packet by mouth daily.  vitamin B-12 (CYANOCOBALAMIN) 1000 MCG tablet Take 1,000 mcg by mouth daily.  furosemide (LASIX) 40 MG tablet Take 40 mg by mouth 2 times daily.       calcitRIOL (ROCALTROL) 0.25 MCG bladder       Review of Systems:   ROS deferred as means to protect PPE due to shortage & due to CORONAVIRUS risk. Physical exam:   Constitutional:  VITALS:  /64   Pulse 120   Temp 97.9 °F (36.6 °C) (Axillary)   Resp (!) 35   Ht 5' 8\" (1.727 m)   Wt 180 lb 9.6 oz (81.9 kg)   SpO2 94%   BMI 27.46 kg/m²   Exam deferred as means to protect PPE due to shortage & due to CORONAVIRUS risk. Data/  CBC:   Lab Results   Component Value Date    WBC 13.7 04/01/2021    RBC 4.24 04/01/2021    HGB 14.8 04/01/2021    HCT 44.7 04/01/2021    .5 04/01/2021    MCH 34.8 04/01/2021    MCHC 33.0 04/01/2021    RDW 14.2 04/01/2021     04/01/2021    MPV 9.5 04/01/2021     BMP:    Lab Results   Component Value Date     04/01/2021    K 4.6 04/01/2021     04/01/2021    CO2 18 04/01/2021    BUN 55 04/01/2021    LABALBU 3.4 04/01/2021    CREATININE 2.3 04/01/2021    CALCIUM 8.7 04/01/2021    GFRAA 33 04/01/2021    GFRAA 50 06/13/2013    LABGLOM 27 04/01/2021    GLUCOSE 100 04/01/2021         Assessment/    - Acute on chronic sCHF with severe mitral insufficiency    - Acute on chronic kidney disease stage 4   SCr 2.3 on admission, previously 1.8 from Jan 2021    - Atrial fibrillation - chronic    - Anion-gap metabolic acidosis      Plan/    - Trial of lasix infusion with prn dose of diuril  - Trend labs, bp's, urine output  - Would not pursue dialysis if needed    Case d/w Nursing    Thank you for the consultation. Please do not hesitate to call with questions.     [employer]

## 2021-04-01 NOTE — ED PROVIDER NOTES
[facility]  ED  EMERGENCY DEPARTMENTENCOUNTER      Pt Name: Laura Brito  MRN: [id number]  Lopez 7/19/1932  Date ofevaluation: 3/31/2021  Provider: Hu Castro MD    CHIEF COMPLAINT       Chief Complaint   Patient presents with    Shortness of Breath     Pt reports being SOB \"for months\" pt 86% O2 on room air at triage, 2L NC applied by Jennifer Roman.  Fall     Pt reports having a mechanical fall earlier today landing on his buttox, reporting L sided hip pain. denies hitting head, takes coumadin    Leg Swelling     Pt reports feet swelling starting         HISTORY OF PRESENT ILLNESS   (Location/Symptom, Timing/Onset,Context/Setting, Quality, Duration, Modifying Factors, Severity)  Note limiting factors. Laura Brito is a 80 y.o. male  who  has a past medical history of Allergic rhinitis, Atrial fibrillation ([address] CHF (congestive heart failure) ([address] Chronic kidney disease, stage III (moderate), Hyperlipidemia, Hypertension, Macular degeneration, Thyroid disease, and Vitamin D deficiency. who presents to the emergency department for evaluation of shortness of breath and a fall. Patient reports that has been feeling short of breath for the past few months. He states that today his shortness of breath is no worse than normal.  He does endorse leg swelling which he states has been going on for a while. He states that today he was at home and had a mechanical fall and fell backwards onto his buttocks. He reports he fell because he was trying to reach his fridge and lost  falling backwards. denies head injury or trauma. He is currently on Coumadin. He states he was able to get up but called his daughter-in-law who is a nurse and she instructed him to present to the ED for evaluation. On arrival the patient is noted to be hypoxic hypotensive and tachycardic. He states he has been compliant with his medications.   Denies focal weakness changes in vision neck pain chest pains or abdominal pain. He states he is sore where he fell but states the pain is manageable. Patient reports that he has received his Covid vaccinations. HPI    NursingNotes were reviewed. REVIEW OF SYSTEMS    (2-9 systems for level 4, 10 or more for level 5)     Review of Systems   Constitutional: Negative for activity change, chills, fatigue and fever. HENT: Negative for congestion and rhinorrhea. Eyes: Negative for photophobia and visual disturbance. Respiratory: Positive for shortness of breath. Negative for cough, chest tightness and wheezing. Cardiovascular: Positive for leg swelling. Negative for palpitations. Gastrointestinal: Negative for abdominal pain, diarrhea, nausea and vomiting. Endocrine: Negative for polydipsia and polyuria. Genitourinary: Negative for decreased urine volume, difficulty urinating and frequency. Musculoskeletal: Negative for back pain, gait problem, neck pain and neck stiffness. Skin: Negative for color change, rash and wound. Neurological: Negative for dizziness, weakness, light-headedness, numbness and headaches. Psychiatric/Behavioral: Negative for confusion. The patient is not nervous/anxious. All other systems reviewed and are negative. Except as noted above the remainder of the review of systems was reviewed and negative.        PAST MEDICAL HISTORY     Past Medical History:   Diagnosis Date    Allergic rhinitis     Atrial fibrillation ([facility]     CHF (congestive heart failure) (HCC)     Chronic kidney disease, stage III (moderate)     Hyperlipidemia     Hypertension     Macular degeneration     Thyroid disease     Vitamin D deficiency          SURGICALHISTORY       Past Surgical History:   Procedure Laterality Date    CARDIOVERSION  2006    atrial fib    COLONOSCOPY  2013    EYE SURGERY  2000    archie iol    JOINT REPLACEMENT Left 2012    hip    JOINT REPLACEMENT Right 2004    hip         CURRENT MEDICATIONS       Previous Medications    AMIODARONE (CORDARONE) 200 MG TABLET    Take 200 mg by mouth daily. ATORVASTATIN (LIPITOR) 20 MG TABLET    Take 20 mg by mouth daily. CALCITRIOL (ROCALTROL) 0.25 MCG CAPSULE    Take 0.25 mcg by mouth daily. FUROSEMIDE (LASIX) 40 MG TABLET    Take 40 mg by mouth 2 times daily. LISINOPRIL (PRINIVIL;ZESTRIL) 20 MG TABLET    Take 20 mg by mouth every evening. METHIMAZOLE (TAPAZOLE) 10 MG TABLET    Take 10 mg by mouth 3 times daily. MISC NATURAL PRODUCTS (OSTEO BI-FLEX ADV TRIPLE ST PO)    Take  by mouth daily. MULTIPLE VITAMINS-MINERALS (OCUVITE PO)    Take  by mouth daily. PSYLLIUM (KONSYL) 28.3 % PACK    Take 1 packet by mouth daily. VITAMIN B-12 (CYANOCOBALAMIN) 1000 MCG TABLET    Take 1,000 mcg by mouth daily. VITAMIN D (ERGOCALCIFEROL) 76275 UNITS CAPS CAPSULE    Take 50,000 Units by mouth See Admin Instructions. 2x monthly    WARFARIN (COUMADIN) 2.5 MG TABLET    Take 2.5 mg by mouth Daily. 5mg  2days            Patient has no known allergies. FAMILY HISTORY       Family History   Problem Relation Age of Onset    Cancer Father         bladder          SOCIAL HISTORY       Social History     Socioeconomic History    Marital status:      Spouse name: None    Number of children: None    Years of education: None    Highest education level: None   Occupational History    None   Social Needs    Financial resource strain: None    Food insecurity     Worry: None     Inability: None    Transportation needs     Medical: None     Non-medical: None   Tobacco Use    Smoking status: Never Smoker    Smokeless tobacco: Never Used   Substance and Sexual Activity    Alcohol use:  Yes     Alcohol/week: 0.0 standard drinks     Comment: rarely    Drug use: No    Sexual activity: None   Lifestyle    Physical activity     Days per week: None     Minutes per session: None    Stress: None   Relationships    Social connections     Talks on phone: None     Gets together: Skin is warm and dry. Capillary Refill: Capillary refill takes less than 2 seconds. Neurological:      General: No focal deficit present. Mental Status: He is alert and oriented to person, place, and time. Cranial Nerves: No cranial nerve deficit. Motor: No weakness. RESULTS     EKG: All EKG's are interpreted by the Emergency Department Physician who either signs or Co-signsthis chart in the absence of a cardiologist.    EKG shows an irregular wide-complex rhythm with noticeable P waves consistent with atrial fibrillation. Ventricular rate is 105 bpm.  QTc interval is prolonged. Patient has normal axis. There are no significant ST elevations or depressions EG is nondiagnostic for ACS. Compared EKG from 1/31/2014 patient is no longer in sinus rhythm. He has diffuse ST changes in all leads. RADIOLOGY:   Non-plain filmimages such as CT, Ultrasound and MRI are read by the radiologist. Plain radiographic images are visualized and preliminarily interpreted by the emergency physician with the below findings:      Interpretation per the Radiologist below, if available at the time ofthis note:    CT CHEST ABDOMEN PELVIS WO CONTRAST   Preliminary Result   1. No evidence of acute traumatic injury in the chest, abdomen or pelvis. 2. Extensive bilateral ground-glass opacities in both lungs. Imaging   features can be seen with COVID-19 pneumonia, though are nonspecific and can   occur with a variety of infectious and noninfectious processes. PneInd   3. Cardiomegaly with coronary artery disease. 4. Small bilateral pleural effusions. 5. Multinodular thyroid goiter with asymmetric enlargement of the left   thyroid and rightward shift of the trachea. Thyroid nodules measure up to   5.1 cm. Please see follow-up recommendations below. RECOMMENDATIONS:   5.1 cm incidental thyroid nodule with heterogeneous and enlarged thyroid.    Recommend thyroid US if clinically warranted given [facility], [address] [employer]   Phone [phone]   PROTIME-INR - Abnormal; Notable for the following components:    Protime 48.0 (*)     INR 4.08 (*)     All other components within normal limits    Narrative:     Performed at:  [address], [address] [employer]   Phone [phone]   TROPONIN - Abnormal; Notable for the following components:    Troponin 0.04 (*)     All other components within normal limits    Narrative:     Performed at:  [address], [address] [employer]   Phone [phone]   PROCALCITONIN - Abnormal; Notable for the following components:    Procalcitonin 0.47 (*)     All other components within normal limits    Narrative:     Performed at:  [address], [address] [employer]   Phone [phone], RAPID    Narrative:     Performed at:  [address], [address] [employer]   Phone [phone]   CULTURE, BLOOD 1   CULTURE, BLOOD 2   LACTIC ACID, PLASMA    Narrative:     Performed at:  [address], [address] [employer]   Phone [phone]   CK    Narrative:     Performed at:  [address], [address] [employer]   Phone [phone]   URINE RT REFLEX TO CULTURE       All other labs were within normal range or not returned as of this dictation.     EMERGENCY DEPARTMENT COURSE and DIFFERENTIAL DIAGNOSIS/MDM:   Vitals:    Vitals:    03/31/21 2223 03/31/21 2309 03/31/21 2338 03/31/21 2353   BP: 94/83 101/64 88/69 100/69   Pulse: 101 101 100 102   Resp: (!) 32 18 (!) 33 (!) 39   Temp:       TempSrc:       SpO2: 96% 97% 94% 97%   Weight:       Height:           Patient was given thefollowing medications:  Medications   0.9 % sodium chloride infusion (1,000 mLs and is anticoagulated. Patient feels well on reevaluation. He will be admitted to the hospital for further medical management. The patient is agreeable with plan of care and disposition. REASSESSMENT          CRITICAL CARE TIME   Total Critical Care time was 35 minutes, excluding separatelyreportable procedures. There was a high probability ofclinically significant/life threatening deterioration in the patient's condition which required my urgent intervention. CONSULTS:  PHARMACY TO DOSE VANCOMYCIN    PROCEDURES:  Unless otherwise noted below, none     Procedures    FINAL IMPRESSION      1. Acute respiratory failure with hypoxia (HCC)    2. Elevated brain natriuretic peptide (BNP) level    3. Elevated troponin    4. KIERSTEN (acute kidney injury) ([facility]    5. Fall, initial encounter          DISPOSITION/PLAN   DISPOSITION Admitted 03/31/2021 11:40:01 PM      PATIENT REFERREDTO:  No follow-up provider specified.     DISCHARGEMEDICATIONS:  New Prescriptions    No medications on file          (Please note that portions of this note were completed with a voice recognition program.  Efforts were made to edit the dictations but occasionally words are mis-transcribed.)    Joshua Mcfarlane MD (electronically signed)  Attending Emergency Physician           Joshua Mcfarlane MD  04/01/21 6056

## 2021-04-01 NOTE — PROGRESS NOTES
Comprehensive Nutrition Assessment    Type and Reason for Visit:  Positive Nutrition Screen(Decreased appetite, decreased intake.)    Nutrition Recommendations/Plan:   1. RD recommends liberalizing diet from Cardiac to No Salt Added diet to promote PO intake, d/t decreased intake   2. RD to add Ensure Enlive BID to increase calorie and protein needs. 3. Monitor nutrition adequacy, pertinent labs, bowel habits, wt changes, and clinical progress    Nutrition Assessment:  Pt 80 y.o. male with PMH of CAD CABG valvular disease atrial fibrillation on warfarin admitted with acute hypoxic respiratory failure. His respiratory status declined currently he is on BiPAP, per MD notes. Consulted for decreased appetite, poor PO intake and trouble swallowing. Currently on cardiac diet. Pt droplet plus currently, unable to visit. RD recommends liberalizing diet to no salt added diet d/t patient's decreased intake to promote improved PO intake. Malnutrition Assessment:  Malnutrition Status:  Insufficient data(WIll continue to monitor and assess as more information is available.)    Context:  Acute Illness       Estimated Daily Nutrient Needs:  Energy (kcal):  5612-8568 kcals/day; Weight Used for Energy Requirements:  Ideal(25-30 kcal/kg)     Protein (g):  70-84 g/day; Weight Used for Protein Requirements:  Ideal(1-1.2 g/kg)        Fluid (ml/day):  2000 ml; Method Used for Fluid Requirements:  1 ml/kcal      Nutrition Related Findings:  No BM recorded. Edema RLE +3, LLE +4. . Wounds:  None(Bruising, scattered.)       Current Nutrition Therapies:    DIET CARDIAC;     Anthropometric Measures:  · Height: 5' 8\" (172.7 cm)  · Current Body Weight: 180 lb (81.6 kg)    · Ideal Body Weight: 154 lbs; % Ideal Body Weight 116.9 %   · BMI: 27.4    Nutrition Diagnosis:   · Inadequate oral intake related to early satiety as evidenced by intake 0-25%    Nutrition Interventions:   Food and/or Nutrient Delivery:  Modify Current Diet, Start Oral Nutrition Supplement  Nutrition Education/Counseling:  No recommendation at this time   Coordination of Nutrition Care:  Continue to monitor while inpatient    Goals:  Consume 50% or greater of 3 meals per day and ONS       Nutrition Monitoring and Evaluation:   Behavioral-Environmental Outcomes:  None Identified   Food/Nutrient Intake Outcomes:  Food and Nutrient Intake, Supplement Intake  Physical Signs/Symptoms Outcomes:  Weight     Discharge Planning:    Continue current diet     Electronically signed by Jud Onofre MS, RD, LD on 4/1/21 at 2:03 PM EDT    Contact: Office: [phone]; [address] [place] [address] Road: [phone]

## 2021-04-01 NOTE — CONSULTS
INPATIENT PULMONARY CRITICAL CARE CONSULT NOTE      Chief Complaint/Referring Provider:  Patient is being seen at the request of Dr. Mary Bingham for a consultation for respiratory distress and fluid overload     Presenting HPI: Veronica Priest is a very pleasant 80-year-old male, reportedly is a  living at the [place]. His daughter-in-law is a nurse. The patient has a past medical history of hypertension, hyperlipidemia, hyperthyroidism, CHF, atrial fibrillation, CKD. The patient is followed by Dr. Celeste Pickard for his CKD. The patient presented to the ER on 3/31/2021 for shortness of breath and a fall. He also mentions that his feet were swelling. Reportedly the shortness of breath has been occurring for several months. He was asked to present to the ER by his daughter-in-law, who is a nurse. In the ER the patient was hypoxic, tachycardic, hypotensive. He underwent CT of the chest, abdomen and pelvis. There was evidence of bilateral groundglass opacities, small bilateral pleural effusions, multinodular goiter with rightward shift of the trachea, multiple thyroid nodules up to 5.1 cm. Labs showed mild leukocytosis, macrocytosis. BUN and creatinine, total bilirubin and alkaline phosphatase were elevated. There was marked elevation of proBNP, INR was 4.08. He has received his Covid vaccines, rapid Covid test was negative. The patient was not atrial fibrillation with RVR. He was given a fluid bolus and supplemental oxygen. He was placed on Lasix 40 mg IV twice daily. The patient developed progressive hypoxemia, was placed on BiPAP 12/6 with FiO2 60%. Tidal volume generated almost 6 to 700 mL.        Patient Active Problem List    Diagnosis Date Noted    Hyperbilirubinemia 04/01/2021    Acute respiratory failure with hypoxia (HCC) 04/01/2021    Chronic anticoagulation 04/01/2021    Supratherapeutic INR 04/01/2021    Hyperthyroidism 04/01/2021    LBBB (left bundle branch block) 04/01/2021    KIERSTEN (acute kidney injury) ([facility] [address] 03/31/2021    Chronic atrial fibrillation ([facility] 03/31/2021    Severe mitral regurgitation 03/31/2021    Chronic systolic (congestive) heart failure ([facility] 03/31/2021    Acute on chronic systolic heart failure ([facility] 03/31/2021    S/P laparoscopic cholecystectomy 02/17/2014    Stage 3b chronic kidney disease 01/11/2014       Past Medical History:   Diagnosis Date    Allergic rhinitis     Atrial fibrillation (HCC)     CHF (congestive heart failure) (HCC)     Chronic kidney disease, stage III (moderate)     Hyperlipidemia     Hypertension     Macular degeneration     Thyroid disease     Vitamin D deficiency         Past Surgical History:   Procedure Laterality Date    CARDIOVERSION  2006    atrial fib    COLONOSCOPY  2013    EYE SURGERY  2000    archie iol    JOINT REPLACEMENT Left 2012    hip    JOINT REPLACEMENT Right 2004    hip        Family History   Problem Relation Age of Onset    Cancer Father         bladder        Social History     Tobacco Use    Smoking status: Never Smoker    Smokeless tobacco: Never Used   Substance Use Topics    Alcohol use: Yes     Alcohol/week: 0.0 standard drinks     Comment: rarely        No Known Allergies      Physical Exam:  Blood pressure 101/64, pulse 120, temperature 97.9 °F (36.6 °C), temperature source Axillary, resp. rate (!) 31, height 5' 8\" (1.727 m), weight 180 lb 9.6 oz (81.9 kg), SpO2 94 %.'   Constitutional: Frail and elderly, tachypneic, in spite of being on BiPAP. No acute distress. HENT:  Oropharynx is clear and moist.  Detailed exam due to BiPAP, probably has dentures  Eyes:  Conjunctivae are normal. Pupils equal, round, and reactive to light. No scleral icterus. Neck: . No tracheal deviation present. No obvious thyroid mass. Cardiovascular: Atrial fibrillation, distant heart sounds. No right ventricular heave. No lower extremity edema. Pulmonary/Chest: No wheezes. Few scattered rales.   No accessory muscle usage or stridor. Abdominal: Soft, protuberant abdomen. Bowel sounds present. No distension or tenderness. Musculoskeletal: No cyanosis. No clubbing. No obvious joint deformity. Lymphadenopathy: No cervical or supraclavicular adenopathy. Skin: Skin is warm and dry. No rash or nodules on the exposed extremities. Psychiatric: Normal mood and affect. Behavior is normal.  No anxiety. Neurologic: Alert, awake and oriented. PERRL. Speech fluent. No obvious focal deficit, detailed exam not performed    Results:  CBC:   Recent Labs     03/31/21 2047 04/01/21  0533   WBC 14.4* 13.7*   HGB 15.0 14.8   HCT 44.3 44.7   .2* 105.5*    202     BMP:   Recent Labs     03/31/21 2047 04/01/21  0533    139   K 4.6 4.6    106   CO2 20* 18*   BUN 52* 55*   CREATININE 2.3* 2.3*     LIVER PROFILE:   Recent Labs     03/31/21 2047 04/01/21  0533   AST 46* 48*   ALT 37 37   BILITOT 2.9* 2.7*   ALKPHOS 237* 225*     PT/INR:   Recent Labs     03/31/21 2047 04/01/21  0533   PROTIME 48.0* 49.6*   INR 4.08* 4.21*       Imaging:  I have reviewed radiology images personally. CT CHEST ABDOMEN PELVIS WO CONTRAST   Preliminary Result   1. No evidence of acute traumatic injury in the chest, abdomen or pelvis. 2. Extensive bilateral ground-glass opacities in both lungs. Imaging   features can be seen with COVID-19 pneumonia, though are nonspecific and can   occur with a variety of infectious and noninfectious processes. PneInd   3. Cardiomegaly with coronary artery disease. 4. Small bilateral pleural effusions. 5. Multinodular thyroid goiter with asymmetric enlargement of the left   thyroid and rightward shift of the trachea. Thyroid nodules measure up to   5.1 cm. Please see follow-up recommendations below. RECOMMENDATIONS:   5.1 cm incidental thyroid nodule with heterogeneous and enlarged thyroid. Recommend thyroid US if clinically warranted given patient age.    Reference: J Am Renetta Radiol. 2015 Feb;12(2): 143-50         XR CHEST PORTABLE   Final Result   1. Right upper lobe airspace disease has the appearance of pneumonia, with   atelectasis or pneumonia in the right base and left upper lobe   2. Cardiomegaly with pulmonary vascular congestion but no definite findings   of pulmonary edema   3. Large thyroid goiter           Xr Chest Portable    Result Date: 3/31/2021  EXAMINATION: ONE XRAY VIEW OF THE CHEST 3/31/2021 9:03 pm COMPARISON: 10/30/2011 HISTORY: ORDERING SYSTEM PROVIDED HISTORY: shortness of breath TECHNOLOGIST PROVIDED HISTORY: Reason for exam:->shortness of breath Reason for Exam: sob Acuity: Acute Type of Exam: Initial FINDINGS: Cardiomegaly. Prominent central pulmonary vessels. Large thoracic inlet mass displacing the trachea to the right. Airspace disease in the right upper lobe. Strandy opacity in the right base. Strandy opacity in the left upper lobe. Costophrenic angles sharp     1. Right upper lobe airspace disease has the appearance of pneumonia, with atelectasis or pneumonia in the right base and left upper lobe 2. Cardiomegaly with pulmonary vascular congestion but no definite findings of pulmonary edema 3. Large thyroid goiter     Ct Chest Abdomen Pelvis Wo Contrast    Result Date: 3/31/2021  EXAMINATION: CT OF THE CHEST, ABDOMEN, AND PELVIS WITHOUT CONTRAST, 3/31/2021 10:27 pm TECHNIQUE: CT of the chest, abdomen and pelvis was performed without the administration of intravenous contrast. Multiplanar reformatted images are provided for review. Dose modulation, iterative reconstruction, and/or weight based adjustment of the mA/kV was utilized to reduce the radiation dose to as low as reasonably achievable.  COMPARISON: CT of the abdomen and pelvis, 01/09/2014 HISTORY: ORDERING SYSTEM PROVIDED HISTORY: SOB/fall TECHNOLOGIST PROVIDED HISTORY: Reason for exam: SOB/fall Additional Contrast?  None Decision Support Exception:   Emergency Medical Condition (MA) Reason for RECOMMENDATIONS: 5.1 cm incidental thyroid nodule with heterogeneous and enlarged thyroid. Recommend thyroid US if clinically warranted given patient age. Reference: J Am Renetta Radiol. 2015 Feb;12(2): 143-50     Echocardiogram 11/2/2020 ([facility]): The left atrium is severely dilated. The left ventricle is mildly dilated. Wall thickness at upper limits of normal. Atrial fibrillation with a ventricular rate of 102 beats per minute. Overall left ventricular ejection fraction is estimated to be 25-30%. The left ventricular function is severely reduced. There is moderate to severe global hypokinesis of the left ventricle. There is an abnormal contraction sequence due to intraventricular conduction defect. The Aortic Valve leaflets appear sclerotic. Mild - moderate tricuspid regurgitation is present. Mild to moderate pulmonic valvular regurgitation. The right ventricular systolic function is mildly reduced. The right atrium is mildly dilated. There is moderate to severe mitral regurgitation. The mitral regurgitant jet is anteriorly directed, which is consistent with posterior leaflet pathology. Trace aortic regurgitation. The mitral valve leaflets are mildly myxomatous in appearance. There is mild mitral valve prolapse. LV thrombus can not be excluded. The pulmonary artery is not well visualized. PFT:  None in EMR      Assessment and plan:  Acute respiratory failure, hypoxemic. The patient is tachypneic although he has adequate tidal volumes on BiPAP. Initially his CODE STATUS was felt to be limited, but Dr. Constance Norris talked with the patient's son and he wanted the CODE STATUS changed to full code. The patient will require intubation and mechanical ventilation if he worsens. Acute pulmonary edema. CXR probably represents bilateral edema, less likely pneumonia. BiPAP support for now, Lasix has been ordered. His blood pressure was low in the ER. He does have poor LVEF and severe MR.  CKD/KIERSTEN.   Follows Dr. Timi Mcdaniel,

## 2021-04-01 NOTE — PROGRESS NOTES
04/01/21 0830   Treatment   Is patient on O2? Y   Oxygen Therapy/Pulse Ox   O2 Therapy Oxygen   $Oxygen $Daily Charge   O2 Device PAP (positive airway pressure)   FiO2  60 %   Resp (!) 35   SpO2 94 %   Skin Protection for O2 Device Yes   Blood Gas  Performed? Yes   $ABG $ABG   Site #1 Right Radial   Site Prepped #1 Yes   Number of Attempts #1 2   Pressure Held #1 Yes   How Tolerated?  Tolerated well

## 2021-04-01 NOTE — PROGRESS NOTES
Catherine  or Facility: [facility] From: Del Bruce RE: Marta Hall 07/18/1932 RM: 447 I have some concerns about this patient. His RR are in the 40's on Bipap, his BUN is 55 and his creatine is 2.3. BNP is 32,696. Blood pressure is soft 84/61. Looks like he is working really hard to breathe, are we sure this is the appropriate level of care?  Need Callback: NO CALLBACK REQ C4 PROGRESSIVE CARE    Message sent to MD.       Stat ABG ordered, inpatient consult called to pulm- [phone]

## 2021-04-01 NOTE — ED NOTES
Blood culture set #1 drawn from left forearm with angio. Bottle tops scrubbed with alcohol pads. Site prepped with Prevantics swab, 15 seconds per side, and allowed to dry for 30 seconds prior to venipuncture. Red waste tube drawn prior to collection of specimen. Blood culture set #2 drawn from left forearm with angio. Bottle tops scrubbed with alcohol pads. Site prepped with Prevantics swab, 15 seconds per side, and allowed to dry for 30 seconds prior to venipuncture. Red waste tube drawn prior to collection of specimen.          Tasha Chavez RN  03/31/21 [id number]

## 2021-04-01 NOTE — ACP (ADVANCE CARE PLANNING)
ADVANCED CARE PLANNING    Name:Ronnie Null       :  1932              MRN:  [id number]  Admission Date: 3/31/2021  8:52 PM  Date of Discussion:  3/31/2021      Purpose of Encounter: Advanced care planning in light of advanced age, systolic heart failure, severe mitral insufficiency, acute respiratory fialure. Parties in attendance: :Ronaldo Farrell, Georgia Ortiz MD, Family members:Daughter Carlos Henry  Decisional Capacity:  Doubtful    Objective/Medical Story:  Patient has a longstanding history of severe LV systolic dysfunction, severe mitral insufficiency, and chronic atrial fibrillation for which he is anticoagulated on warfarin. He has previously declined any invasive interventions. He is currently hospitalized for acute respiratory failure and cannot remember his chronic heart conditions. Some degree of cognitive impairment is suspected. He also cannot remember his previous advance directives which, according to his daughter, specify that he would not want CPR or ventilator support. Active Diagnoses: Active Hospital Problems    Diagnosis Date Noted    KIERSTEN (acute kidney injury) ([facility] [address] [N17.9] 2021    Chronic atrial fibrillation (HCC) [I48.20] 2021    Severe mitral regurgitation [I34.0] 2021    Chronic systolic (congestive) heart failure (HCC) [I50.22] 2021    Acute on chronic systolic heart failure (HCC) [I50.23] 2021    Stage 3b chronic kidney disease [N18.32] 2014       These active diagnoses are of sufficient risk that focused discussion on advance care planning is indicated in order to allow the patient to thoughtfully consider personal goals of care; and if situations arise that prevent the ability to personally give input, to ensure appropriate representation of their personal desires for different levels and agressiveness of care. Goals of Care Determinations: Patient wishes to focus on going home. Code Status:  At this time patient wishes to be Limited Code x 4     Time Spent on Advanced Planning Documents: 20 mins. The following items were considered in medical decision making:  Independent review of images , Review / order clinical lab tests. Review / order radiology tests, Decision to obtain old records. Advanced Care Planning Documents: There is a family revocable trust abstract on the chart, but no healthcare advance directive. Electronically signed by Sandy Garcia MD on 3/31/2021 at 11:47 PM    Thank you Andre Armijo MD for the opportunity to be involved in this patient's care. If you have any questions or concerns please feel free to contact me at [phone].

## 2021-04-01 NOTE — PROGRESS NOTES
Received bedside report from off going RN. Pts skin was assessed. Turned and repositioned. Alba in place. Orders verified. Pt A&O able to follow commands. Call light in reach, bed in lowest position, will continue to monitor.

## 2021-04-01 NOTE — PLAN OF CARE
Nutrition Problem #1: Inadequate oral intake  Intervention: Food and/or Nutrient Delivery: Modify Current Diet, Start Oral Nutrition Supplement  Nutritional Goals: Consume 50% or greater of 3 meals per day and ONS

## 2021-04-01 NOTE — CONSULTS
[address] [facility]  [phone]      Attending Physician: Brendan Liang MD  Reason for Consultation/Chief Complaint: Fall and shortness of breath    Subjective   History of Present Illness:  Teresa García is a 80 y.o. patient who presented to the hospital with complaints of fall and shortness of breath. History is very limited from the patient, he currently denies any chest pain, says he does not feel short of breath. History is limited from the patient as he is currently on a BiPAP noninvasive ventilatory mask. I did speak to his daughter by phone and she was able to provide history. History was per her as well as per chart review. She reports over the last day or 2 has been feeling more short of breath and she found him having fallen at home. He was brought to the emergency room yesterday where he was felt to be in heart failure. Initial rapid Covid testing was negative and PCR Covid testing is pending. Evaluation did reveal groundglass opacification on chest imaging. He is being treated with Lasix. Troponin levels were found to be elevated at 0.04 and 0.03. proBNP level was elevated 32,696. Patient has a longstanding history of cardiac disease, gets his usual cardiac care through the [facility] system with Dr. 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 He is also been followed for cardiomyopathy, he had echocardiogram November 2020 which showed ejection fraction of 25 to 30% with moderate to severe mitral vegetation. He followed up with Dr. Trina Grover at [facility] in December 2020 and he declined further evaluation/intervention including invasive procedure/surgery due to advanced age. His weight at that time was 176 pounds and current weight is between 175 and 180 pounds.     Past Medical History:   has a past medical history of Allergic rhinitis, Atrial fibrillation ([address] CHF (congestive heart failure) ([facility] [address] Chronic kidney disease, stage III (moderate), Hyperlipidemia, Hypertension, Macular degeneration, Thyroid disease, and Vitamin D deficiency. Surgical History:   has a past surgical history that includes joint replacement (Left, 2012); joint replacement (Right, 2004); Cardioversion (2006); eye surgery (2000); and Colonoscopy (2013). Social History:   reports that he has never smoked. He has never used smokeless tobacco. He reports current alcohol use. He reports that he does not use drugs. Family History:  family history includes Cancer in his father. Home Medications:  Were reviewed and are listed in nursing record and/or below  Prior to Admission medications    Medication Sig Start Date End Date Taking? Authorizing Provider   amiodarone (CORDARONE) 200 MG tablet Take 200 mg by mouth daily. Yes Historical Provider, MD   atorvastatin (LIPITOR) 20 MG tablet Take 20 mg by mouth daily. Yes Historical Provider, MD   warfarin (COUMADIN) 2.5 MG tablet Take 2.5 mg by mouth Daily. 5mg  2days   Yes Historical Provider, MD   Misc Natural Products (OSTEO BI-FLEX ADV TRIPLE ST PO) Take  by mouth daily. Yes Historical Provider, MD   psyllium (KONSYL) 28.3 % PACK Take 1 packet by mouth daily. Yes Historical Provider, MD   vitamin B-12 (CYANOCOBALAMIN) 1000 MCG tablet Take 1,000 mcg by mouth daily. Historical Provider, MD   furosemide (LASIX) 40 MG tablet Take 40 mg by mouth 2 times daily. Historical Provider, MD   calcitRIOL (ROCALTROL) 0.25 MCG capsule Take 0.25 mcg by mouth daily. Historical Provider, MD   lisinopril (PRINIVIL;ZESTRIL) 20 MG tablet Take 20 mg by mouth every evening. Historical Provider, MD   vitamin D (ERGOCALCIFEROL) 77502 UNITS CAPS capsule Take 50,000 Units by mouth See Admin Instructions. 2x monthly    Historical Provider, MD   Multiple Vitamins-Minerals (OCUVITE PO) Take  by mouth daily.     Historical Provider, MD   methimazole (TAPAZOLE) sounds   Abdomen:   Soft, non-tender, bowel sounds active all four quadrants,  no masses, no organomegaly   Extremities: Extremities normal, atraumatic, no cyanosis or edema   Pulses: 2+ and symmetric   Skin: Skin color pale   Pysch:  Flat mood and affect   Neurologic:  Unable to obtain, patient not able to cooperate with exam        Labs   CBC:   Lab Results   Component Value Date    WBC 13.7 04/01/2021    RBC 4.24 04/01/2021    HGB 14.8 04/01/2021    HCT 44.7 04/01/2021    .5 04/01/2021    RDW 14.2 04/01/2021     04/01/2021     CMP:  Lab Results   Component Value Date     04/01/2021    K 4.6 04/01/2021     04/01/2021    CO2 18 04/01/2021    BUN 55 04/01/2021    CREATININE 2.3 04/01/2021    GFRAA 33 04/01/2021    GFRAA 50 06/13/2013    AGRATIO 1.1 04/01/2021    LABGLOM 27 04/01/2021    GLUCOSE 100 04/01/2021    PROT 6.5 04/01/2021    CALCIUM 8.7 04/01/2021    BILITOT 2.7 04/01/2021    ALKPHOS 225 04/01/2021    AST 48 04/01/2021    ALT 37 04/01/2021     PT/INR:  No results found for: PTINR  HgBA1c:No results found for: LABA1C  Lab Results   Component Value Date    CKTOTAL 188 03/31/2021    TROPONINI 0.03 (H) 04/01/2021         Cardiac Data     Last EKG: Atrial fibrillation, left bundle branch block, this is new as compared to prior EKGs    Echo:    11/2020    Summary:  The left atrium is severely dilated. The left ventricle is mildly dilated. Wall thickness at upper limits of normal.  Atrial fibrillation with a ventricular rate of 102 beats per minute. Overall left ventricular ejection fraction is estimated to be 25-30%. The left ventricular function is severely reduced. There is moderate to severe global hypokinesis of the left ventricle. There is an abnormal contraction sequence due to intraventricular conduction  defect. The Aortic Valve leaflets appear sclerotic. Mild - moderate tricuspid regurgitation is present. Mild to moderate pulmonic valvular regurgitation.   The right patient is not interested in invasive/surgical evaluation. This is reasonable due to advanced age. Palliative measures appear to be appropriate given advanced age, advanced cardiomyopathy and mitral gravitation, palliative care consult has been placed, this has been discussed with the patient's daughter, she is in agreement with this. Acute respiratory failure, being supported with BiPAP    Critical care x35 minutes          Thank you for allowing us to participate in the care of [address] [place] [address]. Please call me with any questions [phone].     Asha Webb MD, [facility]   Interventional Cardiologist  [address]  [phone] [facility]  [phone] [address] [place] [address]  4/1/2021 9:25 AM

## 2021-04-01 NOTE — CONSULTS
Pharmacy Note  Warfarin Consult  Dx: afib  Goal INR range 2-3   Home Warfarin dose: Take 2.5 mg by mouth Daily. 5mg  2 days (per med rec)    Date  INR  Warfarin  4/1                  4.21                  hold  Recommend hold Warfarin tonight x1. Daily INR ordered. Rx will continue to manage therapy per consult order. JAMES Campos Ph. 4/1/2021 8:43 AM

## 2021-04-01 NOTE — PROGRESS NOTES
04/01/21 0833   NIV Type   Equipment Type v60   Mode Bilevel   Mask Type Full face mask   Mask Size Medium   Settings/Measurements   IPAP 12 cmH20   CPAP/EPAP 6 cmH2O   Rate Ordered 10   Resp (!) 35   FiO2  60 %   Vt Exhaled 83 mL   Mask Leak (lpm) 0 lpm   Comfort Level Good   Using Accessory Muscles No   SpO2 94

## 2021-04-01 NOTE — PROGRESS NOTES
04/01/21 1604   NIV Type   Skin Protection for O2 Device Yes   NIV Started/Stopped On   Equipment Type v60   Mode Bilevel   Mask Type Full face mask   Mask Size Medium   Settings/Measurements   IPAP 14 cmH20   CPAP/EPAP 8 cmH2O   Rate Ordered 10   Resp (!) 31   FiO2  65 %   Vt Exhaled 584 mL   Mask Leak (lpm) 0 lpm   Comfort Level Good   Using Accessory Muscles Yes   SpO2 91   Breath Sounds   Right Upper Lobe Diminished   Right Middle Lobe Coarse Crackles   Right Lower Lobe Coarse Crackles   Left Upper Lobe Diminished   Left Lower Lobe Crackles   Alarm Settings   Alarms On Y

## 2021-04-01 NOTE — PROGRESS NOTES
04/01/21 0316   NIV Type   $NIV $Daily Charge   Equipment Type v60   Mode Bilevel   Mask Type Full face mask   Mask Size Medium   Settings/Measurements   IPAP 12 cmH20   CPAP/EPAP 6 cmH2O   Rate Ordered 10   Resp 30   FiO2  60 %   Vt Exhaled 470 mL   Mask Leak (lpm) 450 lpm   Comfort Level Fair   Using Accessory Muscles Yes   SpO2 92   Breath Sounds   Right Upper Lobe Diminished   Right Middle Lobe Crackles   Right Lower Lobe Crackles   Left Upper Lobe Diminished   Left Lower Lobe Crackles   Patient Observation   Observations mepilex on nose   Alarm Settings   Alarms On Y   Press Low Alarm 6 cmH2O   High Pressure Alarm 20 cmH2O   Resp Rate Low Alarm 6   High Respiratory Rate 40 br/min

## 2021-04-01 NOTE — PROGRESS NOTES
Dr. Elizabeth Naranjo paged. \"pt RR still 35-40 on 15L HFNC sating at 90-93% -115 Afib. Do we want to try bipap? \" awaiting response.

## 2021-04-01 NOTE — PROGRESS NOTES
Report given to Radha Soriano, [facility]. Pt moved to bed 434.

## 2021-04-01 NOTE — ED NOTES
Writer spoke with patient's daughter with patient permission with an update at this time.      Celena Stacy RN  03/31/21 3940

## 2021-04-02 LAB
A/G RATIO: 1 (ref 1.1–2.2)
ALBUMIN SERPL-MCNC: 3 G/DL (ref 3.4–5)
ALP BLD-CCNC: 206 U/L (ref 40–129)
ALT SERPL-CCNC: 99 U/L (ref 10–40)
ANION GAP SERPL CALCULATED.3IONS-SCNC: 15 MMOL/L (ref 3–16)
AST SERPL-CCNC: 127 U/L (ref 15–37)
BASOPHILS ABSOLUTE: 0 K/UL (ref 0–0.2)
BASOPHILS RELATIVE PERCENT: 0.1 %
BILIRUB SERPL-MCNC: 1.8 MG/DL (ref 0–1)
BUN BLDV-MCNC: 74 MG/DL (ref 7–20)
CALCIUM SERPL-MCNC: 8.6 MG/DL (ref 8.3–10.6)
CHLORIDE BLD-SCNC: 107 MMOL/L (ref 99–110)
CO2: 18 MMOL/L (ref 21–32)
CREAT SERPL-MCNC: 3 MG/DL (ref 0.8–1.3)
EKG ATRIAL RATE: 97 BPM
EKG DIAGNOSIS: NORMAL
EKG Q-T INTERVAL: 454 MS
EKG QRS DURATION: 160 MS
EKG QTC CALCULATION (BAZETT): 588 MS
EKG R AXIS: 11 DEGREES
EKG T AXIS: 169 DEGREES
EKG VENTRICULAR RATE: 101 BPM
EOSINOPHILS ABSOLUTE: 0 K/UL (ref 0–0.6)
EOSINOPHILS RELATIVE PERCENT: 0 %
GFR AFRICAN AMERICAN: 24
GFR NON-AFRICAN AMERICAN: 20
GLOBULIN: 3.1 G/DL
GLUCOSE BLD-MCNC: 110 MG/DL (ref 70–99)
HCT VFR BLD CALC: 44.6 % (ref 40.5–52.5)
HEMOGLOBIN: 14.7 G/DL (ref 13.5–17.5)
INR BLD: 6.23 (ref 0.86–1.14)
LYMPHOCYTES ABSOLUTE: 0.8 K/UL (ref 1–5.1)
LYMPHOCYTES RELATIVE PERCENT: 5.8 %
MAGNESIUM: 2.7 MG/DL (ref 1.8–2.4)
MCH RBC QN AUTO: 34.7 PG (ref 26–34)
MCHC RBC AUTO-ENTMCNC: 32.9 G/DL (ref 31–36)
MCV RBC AUTO: 105.3 FL (ref 80–100)
MONOCYTES ABSOLUTE: 1.1 K/UL (ref 0–1.3)
MONOCYTES RELATIVE PERCENT: 7.4 %
NEUTROPHILS ABSOLUTE: 12.5 K/UL (ref 1.7–7.7)
NEUTROPHILS RELATIVE PERCENT: 86.7 %
PDW BLD-RTO: 14.1 % (ref 12.4–15.4)
PLATELET # BLD: 206 K/UL (ref 135–450)
PMV BLD AUTO: 9.4 FL (ref 5–10.5)
POTASSIUM SERPL-SCNC: 5 MMOL/L (ref 3.5–5.1)
PROTHROMBIN TIME: 73.6 SEC (ref 10–13.2)
RBC # BLD: 4.23 M/UL (ref 4.2–5.9)
SODIUM BLD-SCNC: 140 MMOL/L (ref 136–145)
TOTAL PROTEIN: 6.1 G/DL (ref 6.4–8.2)
WBC # BLD: 14.4 K/UL (ref 4–11)

## 2021-04-02 PROCEDURE — 94660 CPAP INITIATION&MGMT: CPT

## 2021-04-02 PROCEDURE — 83735 ASSAY OF MAGNESIUM: CPT

## 2021-04-02 PROCEDURE — 93010 ELECTROCARDIOGRAM REPORT: CPT | Performed by: INTERNAL MEDICINE

## 2021-04-02 PROCEDURE — 99233 SBSQ HOSP IP/OBS HIGH 50: CPT | Performed by: NURSE PRACTITIONER

## 2021-04-02 PROCEDURE — 36415 COLL VENOUS BLD VENIPUNCTURE: CPT

## 2021-04-02 PROCEDURE — 2700000000 HC OXYGEN THERAPY PER DAY

## 2021-04-02 PROCEDURE — 94761 N-INVAS EAR/PLS OXIMETRY MLT: CPT

## 2021-04-02 PROCEDURE — 2060000000 HC ICU INTERMEDIATE R&B

## 2021-04-02 PROCEDURE — 6360000002 HC RX W HCPCS: Performed by: INTERNAL MEDICINE

## 2021-04-02 PROCEDURE — 80053 COMPREHEN METABOLIC PANEL: CPT

## 2021-04-02 PROCEDURE — 2580000003 HC RX 258: Performed by: INTERNAL MEDICINE

## 2021-04-02 PROCEDURE — 85610 PROTHROMBIN TIME: CPT

## 2021-04-02 PROCEDURE — 99232 SBSQ HOSP IP/OBS MODERATE 35: CPT | Performed by: INTERNAL MEDICINE

## 2021-04-02 PROCEDURE — 85025 COMPLETE CBC W/AUTO DIFF WBC: CPT

## 2021-04-02 RX ADMIN — CEFTRIAXONE SODIUM 1000 MG: 1 INJECTION, POWDER, FOR SOLUTION INTRAMUSCULAR; INTRAVENOUS at 09:41

## 2021-04-02 RX ADMIN — AZITHROMYCIN MONOHYDRATE 500 MG: 500 INJECTION, POWDER, LYOPHILIZED, FOR SOLUTION INTRAVENOUS at 09:52

## 2021-04-02 RX ADMIN — FUROSEMIDE 10 MG/HR: 10 INJECTION, SOLUTION INTRAMUSCULAR; INTRAVENOUS at 22:33

## 2021-04-02 RX ADMIN — FUROSEMIDE 10 MG/HR: 10 INJECTION, SOLUTION INTRAMUSCULAR; INTRAVENOUS at 09:38

## 2021-04-02 ASSESSMENT — PAIN SCALES - GENERAL: PAINLEVEL_OUTOF10: 0

## 2021-04-02 NOTE — PROGRESS NOTES
04/02/21 1954   NIV Type   Skin Protection for O2 Device Yes   Location Nose   Equipment Type v60   Mode Bilevel   Mask Type Full face mask   Mask Size Medium   Settings/Measurements   IPAP 14 cmH20   CPAP/EPAP 8 cmH2O   Rate Ordered 6   Resp (!) 33   FiO2  65 %   Vt Exhaled 727 mL   Minute Volume 24.4 Liters   Mask Leak (lpm) 0 lpm   Comfort Level Good   Using Accessory Muscles No   SpO2 90   Breath Sounds   Right Upper Lobe Clear   Right Middle Lobe Diminished;Clear   Right Lower Lobe Diminished   Left Upper Lobe Clear   Left Lower Lobe Diminished   Alarm Settings   Alarms On Y   Press Low Alarm 6 cmH2O   High Pressure Alarm 30 cmH2O   Apnea (secs) 6 secs   Resp Rate Low Alarm 6   High Respiratory Rate 45 br/min

## 2021-04-02 NOTE — PROGRESS NOTES
04/02/21 0351   NIV Type   $NIV $Daily Charge   Skin Protection for O2 Device Yes   Location Nose   NIV Started/Stopped On   Equipment Type V60   Mode Bilevel   Mask Type Full face mask   Mask Size Medium   Settings/Measurements   IPAP 14 cmH20   CPAP/EPAP 8 cmH2O   Resp 27   FiO2  65 %   Vt Exhaled 520 mL   Minute Volume 25.1 Liters   Mask Leak (lpm) 11 lpm   Comfort Level Good   Using Accessory Muscles Yes   SpO2 96

## 2021-04-02 NOTE — PROGRESS NOTES
[facility]   Daily Progress Note    Admit Date:  3/31/2021  HPI:    Chief Complaint   Patient presents with    Shortness of Breath     Pt reports being SOB \"for months\" pt 86% O2 on room air at triage, 2L NC applied by Olive Nguyen.  Fall     Pt reports having a mechanical fall earlier today landing on his buttox, reporting L sided hip pain. denies hitting head, takes coumadin    Leg Swelling     Pt reports feet swelling starting      Presented with fall and increasing shortness of breath. Found to be in AF with RVR with new LBBB. Troponin's elevated though flat. Hx of Afib/ flutter on Amio and warfarin, moderate to severe MR, NICM as well as HTN, HLD, CKD. He follows with Dr. Tien Johnson, seen in Dec 2020, reviewed echo with worsening MR and LVEF. Declined further testing/ procedures. Subjective:  Mr. Lyle Villalobos awake, alert, on Bipap, daughter at bedside - discussed at length. He is able to voice/ nod yes and no.   Denies pain or difficulty breathing though tachypnea on exam.    Objective:   Patient Vitals for the past 24 hrs:   BP Temp Temp src Pulse Resp SpO2 Weight   04/02/21 0814 -- -- -- -- -- 96 % --   04/02/21 0812 -- -- -- -- 30 -- --   04/02/21 0800 95/65 97.6 °F (36.4 °C) Axillary 82 30 95 % --   04/02/21 0545 -- -- -- -- -- -- 185 lb 14.4 oz (84.3 kg)   04/02/21 0500 -- -- -- 88 -- 96 % --   04/02/21 0351 -- -- -- -- 27 -- --   04/02/21 0344 (!) 88/57 98.2 °F (36.8 °C) Axillary -- 29 -- --   04/01/21 2352 87/61 97.6 °F (36.4 °C) -- 89 27 -- --   04/01/21 2200 -- -- -- 87 -- 93 % --   04/01/21 2054 -- -- -- -- 27 97 % --   04/01/21 2053 -- -- -- -- 27 -- --   04/01/21 2000 105/67 -- -- 102 -- 95 % --   04/01/21 1745 94/64 -- -- 102 -- 94 % --   04/01/21 1715 (!) 83/57 -- -- 108 -- (!) 89 % --   04/01/21 1643 (!) 86/53 98.4 °F (36.9 °C) Axillary 108 (!) 37 98 % --   04/01/21 1604 -- -- -- -- (!) 31 -- --   04/01/21 1139 104/63 97.9 °F (36.6 °C) Axillary 99 (!) 41 -- --       Intake/Output Summary (Last 24 hours) at 4/2/2021 1053  Last data filed at 4/2/2021 0800  Gross per 24 hour   Intake 385.1 ml   Output 790 ml   Net -404.9 ml     Wt Readings from Last 3 Encounters:   04/02/21 185 lb 14.4 oz (84.3 kg)   08/03/18 180 lb (81.6 kg)   02/17/14 188 lb (85.3 kg)         ASSESSMENT:   1. CHF, acute on chronic systolic: weight up 5 lbs, net neg ~ 1 liter, on Lasix gtt 10 mg/hr  2. Afib/ flutter: chronic, rate controlled on Diltiazem 6 mg/hr, anticoagulation with warfarin, held for INR >6 (increased since admission)  3. Mitral regurgitation: moderate to severe, repeat echo pending  4. Acute on chronic kidney disease 4: per nephrology decreasing UOP overnight  5. Acute respiratory failiure: remains on Bipap  6. Cardiomyopathy, non-ischemic:  EF 25-30% Nov 2020  7. LBBB: progressed from IVCD  8. HTN: now hypotensive        PLAN:  1. Okay to hold IV diltiazem for relative rate control, HR < 110 given hypotension  2. Discussed with daughter option of adding inotrope to assist in diuresis. She will discuss with patient and brothers  3. Limited echo  4. No ACEi/ ARB/ ARNI given hypotension and KIERSTEN/ CKD  5. No BB given hypotension.   Amio being held due to NPO on [facility], 4/2/2021, 10:53 AM  [address]   [phone]       Telemetry: AFib/ flutter 80-90's, isolated PVC's  NYHA: IV    Physical Exam:  General:  Awake, alert, NAD  Skin:  Warm and dry  Neck:  JVP full to jaw  Chest:  Bibasilar rales 1/2 up  Cardiovascular:  Irreg Irreg, normal N6X1, + systolic murmur no g/r  Abdomen:  Soft, nontender, +bowel sounds  Extremities:  1+ pretibial pitting edema        Medications:    amiodarone  200 mg Oral Daily    atorvastatin  20 mg Oral Daily    methIMAzole  10 mg Oral TID    calcitRIOL  0.25 mcg Oral Daily    psyllium  1 packet Oral Daily    cefTRIAXone (ROCEPHIN) IV  1,000 mg Intravenous Q24H    azithromycin  500 mg Intravenous Q24H    warfarin (COUMADIN) daily dosing (placeholder)   Other RX Placeholder      sodium chloride      furosemide (LASIX) 1mg/ml infusion 10 mg/hr (04/02/21 0938)    dilTIAZem (CARDIZEM) 125 mg in dextrose 5% 125 mL infusion 6 mg/hr (04/02/21 0800)       Lab Data: Lab results independently reviewed and analyzed by myself 4/2/21   CBC:   Recent Labs     03/31/21 2047 04/01/21 0533 04/02/21  0458   WBC 14.4* 13.7* 14.4*   HGB 15.0 14.8 14.7    202 206     BMP:    Recent Labs     03/31/21 2047 04/01/21  0533 04/02/21  0458    139 140   K 4.6 4.6 5.0   CO2 20* 18* 18*   BUN 52* 55* 74*   CREATININE 2.3* 2.3* 3.0*     INR:    Recent Labs     03/31/21 2047 04/01/21  0533 04/02/21  0458   INR 4.08* 4.21* 6.23*     BNP:    Recent Labs     03/31/21 2047   PROBNP 32,696*     Cardiac Enzymes:   Recent Labs     04/01/21  0826 04/01/21  1412 04/01/21  2034   TROPONINI 0.03* 0.04* 0.04*     Lipids:   Lab Results   Component Value Date    TRIG 162 07/16/2014    TRIG 127 10/05/2012    HDL 39 07/16/2014    HDL 45 10/05/2012    LDLCALC 71 07/16/2014    LDLCALC 64 10/05/2012       Cardiac Imaging:   ECHO  11/2020:    Summary:  The left atrium is severely dilated. The left ventricle is mildly dilated. Wall thickness at upper limits of normal.  Atrial fibrillation with a ventricular rate of 102 beats per minute. Overall left ventricular ejection fraction is estimated to be 25-30%. The left ventricular function is severely reduced. There is moderate to severe global hypokinesis of the left ventricle. There is an abnormal contraction sequence due to intraventricular conduction defect. The Aortic Valve leaflets appear sclerotic. Mild - moderate tricuspid regurgitation is present. Mild to moderate pulmonic valvular regurgitation. The right ventricular systolic function is mildly reduced. The right atrium is mildly dilated. There is moderate to severe mitral regurgitation.   The mitral regurgitant jet is anteriorly directed, which is consistent with posterior leaflet pathology. Trace aortic regurgitation. The mitral valve leaflets are mildly myxomatous in appearance. There is mild mitral valve prolapse. LV thrombus can not be excluded. The pulmonary artery is not well visualized.

## 2021-04-02 NOTE — PROGRESS NOTES
04/02/21 1622   NIV Type   Skin Protection for O2 Device Yes   Location Nose   NIV Started/Stopped On   Equipment Type v60   Mode Bilevel   Mask Type Full face mask   Mask Size Medium   Settings/Measurements   IPAP 14 cmH20   CPAP/EPAP 8 cmH2O   Resp 30   FiO2  65 %   Vt Exhaled 680 mL   Minute Volume 26.7 Liters   Mask Leak (lpm) 0 lpm   Comfort Level Good   Using Accessory Muscles No   SpO2 90   Breath Sounds   Right Upper Lobe Diminished   Right Middle Lobe Diminished   Right Lower Lobe Diminished   Left Upper Lobe Diminished   Left Lower Lobe Diminished   Alarm Settings   Alarms On Y   Press Low Alarm 6 cmH2O   High Pressure Alarm 30 cmH2O   Resp Rate Low Alarm 6   High Respiratory Rate 40 br/min

## 2021-04-02 NOTE — PROGRESS NOTES
Physician Progress Note      Geni Cochran  CSN #:                  [id number]  :                       1932  ADMIT DATE:       3/31/2021 8:52 PM  [address] [place] DATE:  RESPONDING  PROVIDER #:        Devaughn Marcelo MD          QUERY TEXT:    Pt admitted with Possible PNA . If possible, please document in the progress   notes and discharge summary if you are evaluating and /or treating any of the   following: The medical record reflects the following:  Risk Factors: systolic CHF, recent Covid vaccine, Negative Covid test    Clinical Indicators: On Arrival- WBC 14.4, HR 97, T 98.1, SBP 78, RR 33-39   with sat 87%, BNP 50878, PCT 0.47->0.56->0. 65. CXR 3/31-Right upper lobe airspace disease has the appearance of pneumonia,   with atelectasis or pneumonia in the right base and left upper lobe 2. Cardiomegaly with pulmonary vascular congestion but no definite findings of   pulmonary edema\". CT -\"Extensive bilateral ground-glass opacities in both   lungs. ?Imaging features can be seen with COVID-19 pneumonia\"    Treatment: XR/CT, 1 L IVF with cont infusion, Zitrhomax, Cefepime x1,   Rocephin,    Thank you,  Gordo Abdullahi RN, BSN, [employer]   [phone]  Options provided:  -- Sepsis, present on arrival  -- No Sepsis, possible bacterial PNA only  -- Other - I will add my own diagnosis  -- Disagree - Not applicable / Not valid  -- Disagree - Clinically unable to determine / Unknown  -- Refer to Clinical Documentation Reviewer    PROVIDER RESPONSE TEXT:    This patient has sepsis which was present on arrival.    Query created by: Adilia Colon on 2021 2:50 PM      QUERY TEXT:    Patient admitted with Possible PNA. Noted initial testing negative for   COVID-19 on 3/31 and  . If possible, please document in the progress notes   and discharge summary if COVID-19 was:     The medical record reflects the following:  Risk Factors: CHF, Recent Covid vaccines  Clinical Indicators: Rapid Covid Negative 3/31, PCR Covid negative 4/1. Chest   CT 4/1-\"Extensive bilateral ground-glass opacities in both lungs. ?Imaging   features can be seen with COVID-19 pneumonia\". Per Attending PN 4/2-\"Acute   Respiratory failure, Possible pneumonia bacterial as well as Covid, and CHF\". Per H&P-\"COVID-19 NAAT was negatve in the ER. He has been vaccinated. Suspicion is low. \"    Treatment: CXR/CT, Isolation precautions, Labs    Thank you,  Jessica Matos RN, CDS  [phone]    Note: Possible (or similar verbiage reflecting an uncertain diagnosis)   COVID-19 infection may not be reported as confirmed. However, a diagnosis may   be considered as confirmed based on the clinical impression of the provider,   regardless of the presence of a confirmatory test or availability of a test   result. At present, a negative test result does not exclude the diagnosis of   COVID-19 infection. Options provided:  -- COVID-19 ruled out after study  -- COVID-19 confirmed after study with initial testing suspected to be false   negative  -- Other - I will add my own diagnosis  -- Disagree - Not applicable / Not valid  -- Disagree - Clinically unable to determine / Unknown  -- Refer to Clinical Documentation Reviewer    PROVIDER RESPONSE TEXT:    COVID-19 was ruled out after study.     Query created by: Deandra Melendez on 4/2/2021 3:00 PM      Electronically signed by:  Yasmin Osler MD 4/2/2021 3:13 PM

## 2021-04-02 NOTE — PROGRESS NOTES
Hospitalist Progress Note      PCP: Andre Armijo MD    Date of Admission: 3/31/2021    Chief Complaint: Fall, dyspnea    Hospital Course:   Nic Pickard is a 80 y.o. male. He presents from home via ambulance. Upon arrival he relates he activated EMS because he fell this morning. This is his only complaint. He denies loss of consciousness and injury.     Patient has a longstanding history of severe LV systolic dysfunction, severe mitral insufficiency, and chronic atrial fibrillation for which he is anticoagulated on warfarin. He has followed up with cardiologist, Dr. Sherin Moreno, through [facility] since 2017. He has declined any interventions such as mitral annuloplasty.     Tonight I asked the patient about his chronic medical problems and he was completely unaware of any heart failure or valvular disease. He does not remember declining operative intervention either. He knows he sees Dr. Sherin Moreno. He does know he takes warfarin for atrial fibrillation. He is prescribed furosemide to take on a prn basis and he replies that he's never heard of the drug. I spoke with his daughter later and she related he had a bottle of it in his medicine cabinet.     Denies loss of appetite, rigors, sweats, nausea, and diarrhea. Denies dyspnea, orthopnea, PND. He denies chest pressure. He does relate he has not urinated all day.     Has also says he has received two doses of a coronavirus vaccine.     I spoke with the patient's daughter, Soraya Green. Her number is [phone]. She relates that normally his memory is quite clear and is surprised to hear that he can't remember his own medical history or medications.   We also discussed code status (see separate note).       Subjective:   On BiPAP  Still has mild respiratory distress  Able to communicate      Medications:  Reviewed    Infusion Medications    sodium chloride      furosemide (LASIX) 1mg/ml infusion 10 mg/hr (04/01/21 2120)    dilTIAZem (CARDIZEM) 125 mg in dextrose 5% 125 mL infusion 6 mg/hr (04/02/21 0800)     Scheduled Medications    amiodarone  200 mg Oral Daily    atorvastatin  20 mg Oral Daily    methIMAzole  10 mg Oral TID    calcitRIOL  0.25 mcg Oral Daily    psyllium  1 packet Oral Daily    cefTRIAXone (ROCEPHIN) IV  1,000 mg Intravenous Q24H    azithromycin  500 mg Intravenous Q24H    warfarin (COUMADIN) daily dosing (placeholder)   Other RX Placeholder     PRN Meds: sodium chloride flush, sodium chloride, promethazine **OR** ondansetron, acetaminophen **OR** acetaminophen, melatonin, perflutren lipid microspheres      Intake/Output Summary (Last 24 hours) at 4/2/2021 0937  Last data filed at 4/2/2021 0800  Gross per 24 hour   Intake 385.1 ml   Output 790 ml   Net -404.9 ml       Physical Exam Performed:    BP 95/65   Pulse 82   Temp 97.6 °F (36.4 °C) (Axillary)   Resp 30   Ht 5' 8\" (1.727 m)   Wt 185 lb 14.4 oz (84.3 kg)   SpO2 96%   BMI 28.27 kg/m²     General appearance: Mild respiratory distress, appears stated age and cooperative. On BiPAP  HEENT: Pupils equal, round, and reactive to light. Conjunctivae/corneas clear. Neck: Supple, with full range of motion. No jugular venous distention. Trachea midline. Respiratory: On BiPAP. Bilateral crackles, bilaterally without Rales/Wheezes/Rhonchi. Cardiovascular: Regular rate and rhythm with normal S1/S2 with murmurs, rubs or gallops. Abdomen: Soft, non-tender, non-distended with normal bowel sounds. Musculoskeletal: No clubbing, cyanosis , has 2+ edema bilaterally. Full range of motion without deformity. Skin: Skin color, texture, turgor normal.  No rashes or lesions. Neurologic:  Neurovascularly intact without any focal sensory/motor deficits.  .  Psychiatric: Alert and oriented,   Capillary Refill: Brisk,< 3 seconds   Peripheral Pulses: +2 palpable, equal bilaterally       Labs:   Recent Labs     03/31/21 2047 04/01/21  0533 04/02/21  0458   WBC 14.4* 13.7* 14.4*   HGB 15.0 14.8 14.7   HCT 44.3 44.7 44.6    202 206     Recent Labs     03/31/21 2047 04/01/21 0533 04/02/21  0458    139 140   K 4.6 4.6 5.0    106 107   CO2 20* 18* 18*   BUN 52* 55* 74*   CREATININE 2.3* 2.3* 3.0*   CALCIUM 8.5 8.7 8.6     Recent Labs     03/31/21 2047 04/01/21 0533 04/02/21  0458   AST 46* 48* 127*   ALT 37 37 99*   BILITOT 2.9* 2.7* 1.8*   ALKPHOS 237* 225* 206*     Recent Labs     03/31/21 2047 04/01/21 0533 04/02/21  0458   INR 4.08* 4.21* 6.23*     Recent Labs     03/31/21 2047 04/01/21  0826 04/01/21  1412 04/01/21 2034   CKTOTAL 188  --   --   --    TROPONINI 0.04* 0.03* 0.04* 0.04*       Urinalysis:      Lab Results   Component Value Date    NITRU Negative 11/24/2014    WBCUA 0-2 11/24/2014    BACTERIA 1+ 11/24/2014    RBCUA None seen 11/24/2014    BLOODU Negative 11/24/2014    SPECGRAV 1.020 11/24/2014    GLUCOSEU Negative 11/24/2014    GLUCOSEU NEGATIVE 09/02/2011       Radiology:  CT CHEST ABDOMEN PELVIS WO CONTRAST   Final Result   1. No evidence of acute traumatic injury in the chest, abdomen or pelvis. 2. Extensive bilateral ground-glass opacities in both lungs. Imaging   features can be seen with COVID-19 pneumonia, though are nonspecific and can   occur with a variety of infectious and noninfectious processes. PneInd   3. Cardiomegaly with coronary artery disease. 4. Small bilateral pleural effusions. 5. Multinodular thyroid goiter with asymmetric enlargement of the left   thyroid and rightward shift of the trachea. Thyroid nodules measure up to   5.1 cm. Please see follow-up recommendations below. RECOMMENDATIONS:   5.1 cm incidental thyroid nodule with heterogeneous and enlarged thyroid. Recommend thyroid US if clinically warranted given patient age. Reference: J Am Renetta Radiol. 2015 Feb;12(2): 143-50         XR CHEST PORTABLE   Final Result   1.  Right upper lobe airspace disease has the appearance of pneumonia, with   atelectasis or pneumonia in the right base and left upper lobe   2. Cardiomegaly with pulmonary vascular congestion but no definite findings   of pulmonary edema   3. Large thyroid goiter                 Assessment/Plan:    Active Hospital Problems    Diagnosis    Hyperbilirubinemia [E80.6]    Acute respiratory failure with hypoxia (HCC) [J96.01]    Chronic anticoagulation [Z79.01]    Supratherapeutic INR [R79.1]    Hyperthyroidism [E05.90]    LBBB (left bundle branch block) [I44.7]    KIERSTEN (acute kidney injury) ([facility] [address] [N17.9]    Chronic atrial fibrillation (HCC) [I48.20]    Severe mitral regurgitation [I34.0]    Chronic systolic (congestive) heart failure (HCC) [I50.22]    Acute on chronic systolic heart failure (HCC) [I50.23]    Stage 3b chronic kidney disease [N18.32]     Acute on chronic systolic heart failure,  Severe MR, Chronic atrial fibrillation, anticoagulation, supratherapeutic INR  -On  Lasix  infusion, held entresto. -  Continue amiodarone and atorvastatin.  -Hold warfarin , INR is uptrending there is no evidence of bleeding  -  See outside echo report above. 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 Patient has previously declined any invasive interventions. Cardiology consult appreciated     Acute Respiratory failure, Possible pneumonia bacterial as well as Covid, and CHF  -On BiPAP  -Continue antibiotics,,   Pulmonology evaluation. Appreciated  Covid PCR,-negative  May DC isolation    Hypotension  Beta-blocker is on hold       KIERSTEN on CKD 3b  -  Baseline creatinine ~ 1.8. Currently 2.3.  -    Placed ragland catheter.    -  Lasix infusion,. Held entresto. And,  Nephrotoxic medication and nephrology evaluation, appreciated, following     Hyperthyroidism  -  Caused by multinodular goiter plus amiodarone. -  Continue methimazole 10mg TID. Check TSH and FT4.     Hyperbilirubinemia  -  Associated w/ mild elevation of alkaline phosphatase as well.   This was present on prior labs in 2014 but not [place] [facility] [place] cholecystectomy for acute cholecystitis in 2014. Bilirubin level returned to normal as recently as November, 2019.  -  Suspected congestive hepatopathy as well as low blood pressure-shock liver. No imaging findings to suggest biliary obstruction. DVT Prophylaxis: Supratherapeutic INR  Diet: Dietary Nutrition Supplements: Standard High Calorie Oral Supplement  DIET NO SALT ADDED (3-4 GM);   Code Status: Full code    PT/OT Eval Status:     Dispo -pending improvement of respiratory failure and cardiac status  Overall prognosis is not that great  Palliative care consult is pending    Jocelyne Dumont MD

## 2021-04-02 NOTE — PLAN OF CARE
Problem: Falls - Risk of:  Goal: Will remain free from falls  Description: Will remain free from falls  4/2/2021 1750 by Kaitlin Burns RN  Outcome: Met This Shift  4/2/2021 0526 by Gavino Beard RN  Outcome: Ongoing  Goal: Absence of physical injury  Description: Absence of physical injury  4/2/2021 1750 by Kaitlin Burns RN  Outcome: Met This Shift  4/2/2021 0526 by Gavino Beard RN  Outcome: Ongoing     Problem: Skin Integrity:  Goal: Will show no infection signs and symptoms  Description: Will show no infection signs and symptoms  4/2/2021 1750 by Kaitlin Burns RN  Outcome: Met This Shift  4/2/2021 0526 by Gavino Beard RN  Outcome: Ongoing  Goal: Absence of new skin breakdown  Description: Absence of new skin breakdown  4/2/2021 1750 by Kaitlin Burns RN  Outcome: Met This Shift  4/2/2021 0526 by Gavino Beard RN  Outcome: Ongoing

## 2021-04-02 NOTE — PROGRESS NOTES
INPATIENT PULMONARY CRITICAL CARE PROGRESS NOTE      Reason for visit: Acute respiratory failure, pulmonary edema/volume overload, CKD with KIERSTEN    SUBJECTIVE: The patient continues to remain on BiPAP with FiO2 65%. SaO2 in the low to mid 90s. He remains afebrile and hemodynamically stable, he is in atrial fibrillation. Blood pressure has been low normal.  He has been placed on a Lasix drip, diltiazem was discontinued. The patient is minimally communicative since he is on BiPAP, denies complaints. ROS was not obtainable     Physical Exam:  Blood pressure (!) 86/54, pulse 95, temperature 97.6 °F (36.4 °C), temperature source Oral, resp. rate 30, height 5' 8\" (1.727 m), weight 185 lb 14.4 oz (84.3 kg), SpO2 91 %.'   Constitutional: Frail and elderly, on BiPAP. No acute distress. HENT:  Oropharynx is clear and dry. Detailed exam due to BiPAP, probably has dentures  Eyes:  Conjunctivae are normal. Pupils equal, round, and reactive to light. No scleral icterus. Neck:  No JVD. Cardiovascular: Atrial fibrillation, distant heart sounds. No lower extremity edema. Pulmonary/Chest: No wheezes. No rales. No accessory muscle usage or stridor. Abdominal:  Deferred. Musculoskeletal: No cyanosis. No clubbing. No obvious joint deformity. Lymphadenopathy: Deferred. Skin: Skin is warm and dry. No rash or nodules on the exposed extremities. Psychiatric: Normal mood and affect. Behavior is normal.  No anxiety. Neurologic: Alert, awake.   No obvious focal deficit, detailed exam not performed      Results:  CBC:   Recent Labs     03/31/21 2047 04/01/21 0533 04/02/21  0458   WBC 14.4* 13.7* 14.4*   HGB 15.0 14.8 14.7   HCT 44.3 44.7 44.6   .2* 105.5* 105.3*    202 206     BMP:   Recent Labs     03/31/21 2047 04/01/21  0533 04/02/21  0458    139 140   K 4.6 4.6 5.0    106 107   CO2 20* 18* 18*   BUN 52* 55* 74*   CREATININE 2.3* 2.3* 3.0*     LIVER PROFILE:   Recent Labs 03/31/21 2047 04/01/21  0533 04/02/21  0458   AST 46* 48* 127*   ALT 37 37 99*   BILITOT 2.9* 2.7* 1.8*   ALKPHOS 237* 225* 206*     PT/INR:   Recent Labs     03/31/21 2047 04/01/21 0533 04/02/21  0458   PROTIME 48.0* 49.6* 73.6*   INR 4.08* 4.21* 6.23*       Imaging:  I have reviewed radiology images personally. CT CHEST ABDOMEN PELVIS WO CONTRAST   Final Result   1. No evidence of acute traumatic injury in the chest, abdomen or pelvis. 2. Extensive bilateral ground-glass opacities in both lungs. Imaging   features can be seen with COVID-19 pneumonia, though are nonspecific and can   occur with a variety of infectious and noninfectious processes. PneInd   3. Cardiomegaly with coronary artery disease. 4. Small bilateral pleural effusions. 5. Multinodular thyroid goiter with asymmetric enlargement of the left   thyroid and rightward shift of the trachea. Thyroid nodules measure up to   5.1 cm. Please see follow-up recommendations below. RECOMMENDATIONS:   5.1 cm incidental thyroid nodule with heterogeneous and enlarged thyroid. Recommend thyroid US if clinically warranted given patient age. Reference: J Am Renetta Radiol. 2015 Feb;12(2): 143-50         XR CHEST PORTABLE   Final Result   1. Right upper lobe airspace disease has the appearance of pneumonia, with   atelectasis or pneumonia in the right base and left upper lobe   2. Cardiomegaly with pulmonary vascular congestion but no definite findings   of pulmonary edema   3. Large thyroid goiter           Xr Chest Portable    Result Date: 3/31/2021  EXAMINATION: ONE XRAY VIEW OF THE CHEST 3/31/2021 9:03 pm COMPARISON: 10/30/2011 HISTORY: ORDERING SYSTEM PROVIDED HISTORY: shortness of breath TECHNOLOGIST PROVIDED HISTORY: Reason for exam:->shortness of breath Reason for Exam: sob Acuity: Acute Type of Exam: Initial FINDINGS: Cardiomegaly. Prominent central pulmonary vessels. Large thoracic inlet mass displacing the trachea to the right. Airspace disease in the right upper lobe. Strandy opacity in the right base. Strandy opacity in the left upper lobe. Costophrenic angles sharp     1. Right upper lobe airspace disease has the appearance of pneumonia, with atelectasis or pneumonia in the right base and left upper lobe 2. Cardiomegaly with pulmonary vascular congestion but no definite findings of pulmonary edema 3. Large thyroid goiter     Ct Chest Abdomen Pelvis Wo Contrast    Result Date: 4/2/2021  EXAMINATION: CT OF THE CHEST, ABDOMEN, AND PELVIS WITHOUT CONTRAST, 3/31/2021 10:27 pm TECHNIQUE: CT of the chest, abdomen and pelvis was performed without the administration of intravenous contrast. Multiplanar reformatted images are provided for review. Dose modulation, iterative reconstruction, and/or weight based adjustment of the mA/kV was utilized to reduce the radiation dose to as low as reasonably achievable. COMPARISON: CT of the abdomen and pelvis, 01/09/2014 HISTORY: ORDERING SYSTEM PROVIDED HISTORY: SOB/fall TECHNOLOGIST PROVIDED HISTORY: Reason for exam: SOB/fall Additional Contrast?  None Decision Support Exception:   Emergency Medical Condition (MA) Reason for Exam:  Shortness of breath, fall. Acuity: Acute Type of Exam: Initial Relevant Medical/Surgical History:  Hx of CHF FINDINGS: Chest: Mediastinum: Enlarged multinodular thyroid with heterogeneous nodules measuring up to 5.1 cm. There is associated rightward shift of the trachea secondary to mass effect from the thyroid. The heart is enlarged. There is coronary artery disease. Normal caliber of the aorta. Lungs/pleura: Extensive patchy bilateral ground-glass opacities are suspicious for infection. Small bilateral pleural effusions. Soft Tissues/Bones: No acute bony or soft tissue findings. Abdomen/Pelvis: Organs: The solid organs are incompletely evaluated without intravenous contrast.  The liver, spleen, pancreas, kidneys and adrenal glands are without acute findings.   Status post cholecystectomy. GI/Bowel: No mechanical bowel obstruction. Normal appendix. Diverticulosis without evidence of acute diverticulitis. Pelvis: Limited evaluation of the pelvic soft tissues secondary to metallic streak artifact from bilateral total hip arthroplasties. Peritoneum/Retroperitoneum: Severe atherosclerotic plaque. No evidence for lymphadenopathy. No hemoperitoneum or pneumoperitoneum. No mesenteric hematoma. Bones/Soft Tissues: No acute or aggressive osseous lesions. Grade 1 anterolisthesis of L5 on S1 with associated degenerative change. 1. No evidence of acute traumatic injury in the chest, abdomen or pelvis. 2. Extensive bilateral ground-glass opacities in both lungs. Imaging features can be seen with COVID-19 pneumonia, though are nonspecific and can occur with a variety of infectious and noninfectious processes. PneInd 3. Cardiomegaly with coronary artery disease. 4. Small bilateral pleural effusions. 5. Multinodular thyroid goiter with asymmetric enlargement of the left thyroid and rightward shift of the trachea. Thyroid nodules measure up to 5.1 cm. Please see follow-up recommendations below. RECOMMENDATIONS: 5.1 cm incidental thyroid nodule with heterogeneous and enlarged thyroid. Recommend thyroid US if clinically warranted given patient age. Reference: J Am Renetta Radiol. 2015 Feb;12(2): 143-50     Assessment and plan:  Acute respiratory failure, hypoxemic. The patient is tachypneic although he has adequate tidal volumes on BiPAP. His CODE STATUS is again limited, after discussion with the family  Acute pulmonary edema. CXR probably represents bilateral edema, less likely pneumonia. BiPAP support for now, on Lasix drip. He does have poor LVEF and severe MR.  CKD/KIERSTEN. Follows Dr. Marek Nguynễ, on discussion with Dr. Marek Nguyễn, due to his age he is not a good candidate for renal replacement therapy. On Lasix drip with improved urine output, but worsening BUN and creatinine.   Severe MR, acute on chronic systolic CHF. Cardiology following  Atrial fibrillation on chronic anticoagulation, supratherapeutic INR. Cardiology consulted, on diltiazem drip  Elevated troponin. Probably related to renal insufficiency  Leukocytosis. Unclear whether he has an infection such as pneumonia. Procalcitonin not significantly elevated  Abnormal LFT. Possibly congestive  Macrocytosis. Per IM, possibly check B12 and folate levels  HTN, HLD, hyperthyroidism, vitamin D deficiency. Per IM. DVT prophylaxis. INR elevated. Discussed with patient, nursing.   Prognosis guarded.         Electronically signed by:  Akanksha Martino MD    4/2/2021    3:16 PM.

## 2021-04-02 NOTE — ACP (ADVANCE CARE PLANNING)
ADVANCED CARE PLANNING - REMOTE     Name:Ronnie Null       :  1932              MRN:  [id number]  Admission Date: 3/31/2021  8:52 PM  Date of Discussion: 2021      Purpose of Encounter: Advanced care planning in light of respiratory failure  Parties in attendance: :Ovi Shirley MD, Family members: Daughter Shannan Dallas Capacity:Yes    Remote ACP visit was performed based on new provisions and guidance offered by [facility] on 2020 in setting of COVID 19 outbreak and in order to preserve personal protective equipment in accordance with the flexibilities announced by CMS on 2020. References:   [email or web address] [facility]/[email or web address]/Resources/COVID-19/3_%20Telemed%20Guidance%20Updated%20March%2018. pdf?[email or web address]   [email or web address] pdf     Objective/Medical Story:   80 male admitted with shortness of breath and hypoxic respiratory failure. He has acute systolic congestive heart failure with ejection fraction of 20, valvular heart disease, KIERSTEN, hypotension A. fib with supratherapeutic INR. Currently he is on BiPAP    Active Diagnoses:     Active Hospital Problems    Diagnosis Date Noted    Elevated brain natriuretic peptide (BNP) level [R79.89]     Elevated troponin [R77.8]     Non-ischemic cardiomyopathy ([facility] [address] [I42.8]     Hyperbilirubinemia [E80.6] 2021    Acute respiratory failure with hypoxia (HCC) [J96.01] 2021    Chronic anticoagulation [Z79.01] 2021    Supratherapeutic INR [R79.1] 2021    Hyperthyroidism [E05.90] 2021    LBBB (left bundle branch block) [I44.7] 2021    KIERSTEN (acute kidney injury) ([facility] [address] [N17.9] 2021    Chronic atrial fibrillation ([facility] [address] [I48.20] 2021    Severe mitral regurgitation [I34.0] 2021    Chronic systolic (congestive) heart failure ([address] [I50.22] 2021    Acute on chronic systolic heart failure (HCC) [I50.23] 03/31/2021    Stage 3b chronic kidney disease [N18.32] 01/11/2014       These active diagnoses are of sufficient risk that focused discussion on advance care planning is indicated in order to allow the patient to thoughtfully consider personal goals of care; and if situations arise that prevent the ability to personally give input, to ensure appropriate representation of their personal desires for different levels and agressiveness of care. Goals of Care Determinations: Patient wishes to focus on comfort care  Code Status: At this time patient wishes to be limited code  No cardiac compression, intubation, shock or pressors  Okay for current care BiPAP and infusion of Lasix    Time Spent:     Total time spent face-to-face in education and discussion: 16minutes. Electronically signed by Zara Serna MD on 4/2/2021 at 2:17 PM    Thank you Laya Lucio MD for the opportunity to be involved in this patient's care. If you have any questions or concerns please feel free to contact me at [phone].

## 2021-04-02 NOTE — PROGRESS NOTES
04/01/21 3032   NIV Type   NIV Started/Stopped On   Equipment Type V60   Mode Bilevel   Mask Type Full face mask   Mask Size Medium   Settings/Measurements   IPAP 14 cmH20   CPAP/EPAP 8 cmH2O   Rate Ordered 0   Resp 27   FiO2  65 %   Vt Exhaled 628 mL   Mask Leak (lpm) 0 lpm   Comfort Level Good   Using Accessory Muscles Yes   SpO2 93   Alarm Settings   Alarms On Y

## 2021-04-02 NOTE — PROGRESS NOTES
04/01/21 2053   NIV Type   Skin Assessment Clean, dry, & intact   Skin Protection for O2 Device Yes   Location Nose   NIV Started/Stopped On   Equipment Type V60   Mode Bilevel   Mask Type Full face mask   Mask Size Medium   Settings/Measurements   IPAP 14 cmH20   CPAP/EPAP 8 cmH2O   Rate Ordered 10   Resp 27   FiO2  35 %   Vt Exhaled 720 mL   Mask Leak (lpm) 0 lpm   Comfort Level Good   Using Accessory Muscles Yes   SpO2 96   Alarm Settings   Alarms On Y

## 2021-04-02 NOTE — PROGRESS NOTES
[facility]       Progress Note           Subjective/   80y.o. year old male who we are seeing in consultation for A/CKD. HPI:  Renal function slowly worsening, urine output picking up, 540 ml over last 24 hours,  750 ml over last shift today. Remains sob, hypoxic requiring bi-pap. ROS:  Intake reduced, +weak.     Objective/   GEN:  Chronically ill, BP (!) 86/54   Pulse 95   Temp 97.6 °F (36.4 °C) (Oral)   Resp 30   Ht 5' 8\" (1.727 m)   Wt 185 lb 14.4 oz (84.3 kg)   SpO2 91%   BMI 28.27 kg/m²   HEENT: non-icteric, no JVD  CV: S1, S2, irregular without m/r/g; no LE edema  RESP: Diminished bilaterally without w/r/r; on bi-level  ABD: +bs, soft, nt, no hsm  SKIN: warm, no rashes    Data/  Recent Labs     03/31/21 2047 04/01/21  0533 04/02/21  0458   WBC 14.4* 13.7* 14.4*   HGB 15.0 14.8 14.7   HCT 44.3 44.7 44.6   .2* 105.5* 105.3*    202 206     Recent Labs     03/31/21 2047 04/01/21  0533 04/02/21  0458    139 140   K 4.6 4.6 5.0    106 107   CO2 20* 18* 18*   GLUCOSE 113* 100* 110*   MG  --  2.60* 2.70*   BUN 52* 55* 74*   CREATININE 2.3* 2.3* 3.0*   LABGLOM 27* 27* 20*   GFRAA 33* 33* 24*       Assessment/     - Acute on chronic sCHF with severe mitral insufficiency     - Acute on chronic kidney disease stage 4              SCr 2.3 on admission, up to 3.0, previously 1.8 from Jan 2021     - Atrial fibrillation - chronic     - Anion-gap metabolic acidosis    Plan/      - Continue lasix drip  - Trend labs, bp's, urine output  - Would not pursue dialysis if needed     Case d/w daughter - agrees with no plans for any aggressive measures including CPR,   ventilator, or dialysis  Case d/w Admitting team

## 2021-04-03 ENCOUNTER — APPOINTMENT (OUTPATIENT)
Dept: GENERAL RADIOLOGY | Age: 86
DRG: 871 | End: 2021-04-03
Payer: MEDICARE

## 2021-04-03 LAB
A/G RATIO: 1 (ref 1.1–2.2)
ALBUMIN SERPL-MCNC: 2.7 G/DL (ref 3.4–5)
ALP BLD-CCNC: 221 U/L (ref 40–129)
ALT SERPL-CCNC: 117 U/L (ref 10–40)
ANION GAP SERPL CALCULATED.3IONS-SCNC: 14 MMOL/L (ref 3–16)
AST SERPL-CCNC: 147 U/L (ref 15–37)
BASE EXCESS VENOUS: -3.1 MMOL/L (ref -3–3)
BASOPHILS ABSOLUTE: 0 K/UL (ref 0–0.2)
BASOPHILS RELATIVE PERCENT: 0.1 %
BILIRUB SERPL-MCNC: 1.4 MG/DL (ref 0–1)
BUN BLDV-MCNC: 97 MG/DL (ref 7–20)
CALCIUM SERPL-MCNC: 8.5 MG/DL (ref 8.3–10.6)
CARBOXYHEMOGLOBIN: 1.7 % (ref 0–1.5)
CHLORIDE BLD-SCNC: 108 MMOL/L (ref 99–110)
CO2: 21 MMOL/L (ref 21–32)
CREAT SERPL-MCNC: 3.6 MG/DL (ref 0.8–1.3)
EOSINOPHILS ABSOLUTE: 0 K/UL (ref 0–0.6)
EOSINOPHILS RELATIVE PERCENT: 0.1 %
GFR AFRICAN AMERICAN: 19
GFR NON-AFRICAN AMERICAN: 16
GLOBULIN: 2.8 G/DL
GLUCOSE BLD-MCNC: 98 MG/DL (ref 70–99)
HCO3 VENOUS: 21.7 MMOL/L (ref 23–29)
HCT VFR BLD CALC: 44.9 % (ref 40.5–52.5)
HEMOGLOBIN: 15 G/DL (ref 13.5–17.5)
INR BLD: 8.29 (ref 0.86–1.14)
LYMPHOCYTES ABSOLUTE: 1 K/UL (ref 1–5.1)
LYMPHOCYTES RELATIVE PERCENT: 7.3 %
MAGNESIUM: 2.9 MG/DL (ref 1.8–2.4)
MCH RBC QN AUTO: 35.2 PG (ref 26–34)
MCHC RBC AUTO-ENTMCNC: 33.4 G/DL (ref 31–36)
MCV RBC AUTO: 105.4 FL (ref 80–100)
METHEMOGLOBIN VENOUS: 0.5 %
MONOCYTES ABSOLUTE: 0.8 K/UL (ref 0–1.3)
MONOCYTES RELATIVE PERCENT: 5.6 %
NEUTROPHILS ABSOLUTE: 12 K/UL (ref 1.7–7.7)
NEUTROPHILS RELATIVE PERCENT: 86.9 %
O2 CONTENT, VEN: 20 VOL %
O2 SAT, VEN: 91 %
O2 THERAPY: ABNORMAL
PCO2, VEN: 38.4 MMHG (ref 40–50)
PDW BLD-RTO: 13.8 % (ref 12.4–15.4)
PH VENOUS: 7.37 (ref 7.35–7.45)
PLATELET # BLD: 197 K/UL (ref 135–450)
PMV BLD AUTO: 9.3 FL (ref 5–10.5)
PO2, VEN: 64.4 MMHG (ref 25–40)
POTASSIUM SERPL-SCNC: 4.4 MMOL/L (ref 3.5–5.1)
PROTHROMBIN TIME: 98.2 SEC (ref 10–13.2)
RBC # BLD: 4.26 M/UL (ref 4.2–5.9)
SODIUM BLD-SCNC: 143 MMOL/L (ref 136–145)
TCO2 CALC VENOUS: 23 MMOL/L
TOTAL PROTEIN: 5.5 G/DL (ref 6.4–8.2)
WBC # BLD: 13.8 K/UL (ref 4–11)

## 2021-04-03 PROCEDURE — 85025 COMPLETE CBC W/AUTO DIFF WBC: CPT

## 2021-04-03 PROCEDURE — 6370000000 HC RX 637 (ALT 250 FOR IP): Performed by: INTERNAL MEDICINE

## 2021-04-03 PROCEDURE — 2060000000 HC ICU INTERMEDIATE R&B

## 2021-04-03 PROCEDURE — 85610 PROTHROMBIN TIME: CPT

## 2021-04-03 PROCEDURE — 2700000000 HC OXYGEN THERAPY PER DAY

## 2021-04-03 PROCEDURE — 99233 SBSQ HOSP IP/OBS HIGH 50: CPT | Performed by: NURSE PRACTITIONER

## 2021-04-03 PROCEDURE — 2580000003 HC RX 258: Performed by: NURSE PRACTITIONER

## 2021-04-03 PROCEDURE — 94660 CPAP INITIATION&MGMT: CPT

## 2021-04-03 PROCEDURE — 6360000002 HC RX W HCPCS: Performed by: INTERNAL MEDICINE

## 2021-04-03 PROCEDURE — 83735 ASSAY OF MAGNESIUM: CPT

## 2021-04-03 PROCEDURE — 2580000003 HC RX 258: Performed by: INTERNAL MEDICINE

## 2021-04-03 PROCEDURE — 6360000002 HC RX W HCPCS: Performed by: HOSPITALIST

## 2021-04-03 PROCEDURE — 80053 COMPREHEN METABOLIC PANEL: CPT

## 2021-04-03 PROCEDURE — 99232 SBSQ HOSP IP/OBS MODERATE 35: CPT | Performed by: INTERNAL MEDICINE

## 2021-04-03 PROCEDURE — 36415 COLL VENOUS BLD VENIPUNCTURE: CPT

## 2021-04-03 PROCEDURE — 2580000003 HC RX 258: Performed by: HOSPITALIST

## 2021-04-03 PROCEDURE — 82803 BLOOD GASES ANY COMBINATION: CPT

## 2021-04-03 PROCEDURE — C8923 2D TTE W OR W/O FOL W/CON,CO: HCPCS

## 2021-04-03 PROCEDURE — 94761 N-INVAS EAR/PLS OXIMETRY MLT: CPT

## 2021-04-03 PROCEDURE — 6360000002 HC RX W HCPCS: Performed by: NURSE PRACTITIONER

## 2021-04-03 PROCEDURE — 71045 X-RAY EXAM CHEST 1 VIEW: CPT

## 2021-04-03 RX ORDER — PHYTONADIONE 5 MG/1
2.5 TABLET ORAL ONCE
Status: DISCONTINUED | OUTPATIENT
Start: 2021-04-03 | End: 2021-04-03

## 2021-04-03 RX ADMIN — CEFTRIAXONE SODIUM 1000 MG: 1 INJECTION, POWDER, FOR SOLUTION INTRAMUSCULAR; INTRAVENOUS at 10:42

## 2021-04-03 RX ADMIN — FUROSEMIDE 5 MG/HR: 10 INJECTION, SOLUTION INTRAMUSCULAR; INTRAVENOUS at 12:37

## 2021-04-03 RX ADMIN — PHYTONADIONE 5 MG: 10 INJECTION, EMULSION INTRAMUSCULAR; INTRAVENOUS; SUBCUTANEOUS at 10:25

## 2021-04-03 RX ADMIN — AZITHROMYCIN MONOHYDRATE 500 MG: 500 INJECTION, POWDER, LYOPHILIZED, FOR SOLUTION INTRAVENOUS at 10:11

## 2021-04-03 RX ADMIN — METHIMAZOLE 10 MG: 5 TABLET ORAL at 20:36

## 2021-04-03 NOTE — PROGRESS NOTES
Patient attempted to take a few sips of water. Most of it dripped down the side of his mouth. Will continue to monitor.

## 2021-04-03 NOTE — PROGRESS NOTES
Hospitalist Progress Note      PCP: Jarod Nelson MD    Date of Admission: 3/31/2021    Chief Complaint: Fall, dyspnea    Hospital Course: This is a 80-year-old male with a past medical history with severe LV systolic dysfunction, severe mitral insufficiency, chronic A. fib on Coumadin followed by cardiologist Dr. Saran Odom at [address] [facility] since 2017 admitted after a fall    Subjective: Patient is currently on a BiPAP, says feeling okay. Medications:  Reviewed    Infusion Medications    sodium chloride      furosemide (LASIX) 1mg/ml infusion 5 mg/hr (04/03/21 0023)    dilTIAZem (CARDIZEM) 125 mg in dextrose 5% 125 mL infusion Stopped (04/02/21 1236)     Scheduled Medications    phytonadione (VITAMIN K)  IVPB  5 mg Intravenous Once    amiodarone  200 mg Oral Daily    atorvastatin  20 mg Oral Daily    methIMAzole  10 mg Oral TID    calcitRIOL  0.25 mcg Oral Daily    psyllium  1 packet Oral Daily    cefTRIAXone (ROCEPHIN) IV  1,000 mg Intravenous Q24H    azithromycin  500 mg Intravenous Q24H    warfarin (COUMADIN) daily dosing (placeholder)   Other RX Placeholder     PRN Meds: sodium chloride flush, sodium chloride, promethazine **OR** ondansetron, acetaminophen **OR** acetaminophen, melatonin, perflutren lipid microspheres      Intake/Output Summary (Last 24 hours) at 4/3/2021 0995  Last data filed at 4/3/2021 0600  Gross per 24 hour   Intake 564.59 ml   Output 1830 ml   Net -1265.41 ml       Physical Exam Performed:    BP (!) 93/59   Pulse 91   Temp 97.6 °F (36.4 °C) (Axillary)   Resp 28   Ht 5' 8\" (1.727 m)   Wt 177 lb 1.6 oz (80.3 kg)   SpO2 91%   BMI 26.93 kg/m²     General appearance: No apparent distress, appears stated age and cooperative. HEENT: Pupils equal, round, and reactive to light. Conjunctivae/corneas clear. Neck: Supple, with full range of motion. No jugular venous distention. Trachea midline. Respiratory:  Normal respiratory effort.  Clear to auscultation, bilaterally without Rales/Wheezes/Rhonchi. Decreased breath  Cardiovascular: Regular rate and rhythm with normal S1/S2 without murmurs, rubs or gallops. Abdomen: Soft, non-tender, non-distended with normal bowel sounds. Musculoskeletal: No clubbing, cyanosis or edema bilaterally. Full range of motion without deformity. Skin: Skin color, texture, turgor normal.  No rashes or lesions. Neurologic:  Neurovascularly intact without any focal sensory/motor deficits. Cranial nerves: II-XII intact, grossly non-focal.  Psychiatric: Alert and oriented, thought content appropriate, normal insight  Capillary Refill: Brisk,< 3 seconds   Peripheral Pulses: +2 palpable, equal bilaterally       Labs:   Recent Labs     04/01/21 0533 04/02/21 0458 04/03/21  0519   WBC 13.7* 14.4* 13.8*   HGB 14.8 14.7 15.0   HCT 44.7 44.6 44.9    206 197     Recent Labs     04/01/21 0533 04/02/21 0458 04/03/21  0519    140 143   K 4.6 5.0 4.4    107 108   CO2 18* 18* 21   BUN 55* 74* 97*   CREATININE 2.3* 3.0* 3.6*   CALCIUM 8.7 8.6 8.5     Recent Labs     04/01/21 0533 04/02/21  0458 04/03/21  0519   AST 48* 127* 147*   ALT 37 99* 117*   BILITOT 2.7* 1.8* 1.4*   ALKPHOS 225* 206* 221*     Recent Labs     04/01/21 0533 04/02/21 0458 04/03/21  0519   INR 4.21* 6.23* 8.29*     Recent Labs     03/31/21  2047 04/01/21  0826 04/01/21  1412 04/01/21  2034   CKTOTAL 188  --   --   --    TROPONINI 0.04* 0.03* 0.04* 0.04*       Urinalysis:      Lab Results   Component Value Date    NITRU Negative 11/24/2014    WBCUA 0-2 11/24/2014    BACTERIA 1+ 11/24/2014    RBCUA None seen 11/24/2014    BLOODU Negative 11/24/2014    SPECGRAV 1.020 11/24/2014    GLUCOSEU Negative 11/24/2014    GLUCOSEU NEGATIVE 09/02/2011       Radiology:  XR CHEST PORTABLE   Final Result   Worsening multifocal airspace opacities and new pleural effusions. CT CHEST ABDOMEN PELVIS WO CONTRAST   Final Result   1.  No evidence of acute traumatic injury in the chest, abdomen or pelvis. 2. Extensive bilateral ground-glass opacities in both lungs. Imaging   features can be seen with COVID-19 pneumonia, though are nonspecific and can   occur with a variety of infectious and noninfectious processes. PneInd   3. Cardiomegaly with coronary artery disease. 4. Small bilateral pleural effusions. 5. Multinodular thyroid goiter with asymmetric enlargement of the left   thyroid and rightward shift of the trachea. Thyroid nodules measure up to   5.1 cm. Please see follow-up recommendations below. RECOMMENDATIONS:   5.1 cm incidental thyroid nodule with heterogeneous and enlarged thyroid. Recommend thyroid US if clinically warranted given patient age. Reference: J Am Renetta Radiol. 2015 Feb;12(2): 143-50         XR CHEST PORTABLE   Final Result   1. Right upper lobe airspace disease has the appearance of pneumonia, with   atelectasis or pneumonia in the right base and left upper lobe   2. Cardiomegaly with pulmonary vascular congestion but no definite findings   of pulmonary edema   3. Large thyroid goiter                 Assessment/Plan:    Active Hospital Problems    Diagnosis    Elevated brain natriuretic peptide (BNP) level [R79.89]    Elevated troponin [R77.8]    Non-ischemic cardiomyopathy (HCC) [I42.8]    Hyperbilirubinemia [E80.6]    Acute respiratory failure with hypoxia (HCC) [J96.01]    Chronic anticoagulation [Z79.01]    Supratherapeutic INR [R79.1]    Hyperthyroidism [E05.90]    LBBB (left bundle branch block) [I44.7]    KIERSTEN (acute kidney injury) ([facility] [address] [N17.9]    Chronic atrial fibrillation (HCC) [I48.20]    Severe mitral regurgitation [I34.0]    Chronic systolic (congestive) heart failure (HCC) [I50.22]    Acute on chronic systolic heart failure (HCC) [I50.23]    Stage 3b chronic kidney disease [N18.32]     1. Admitted with acute on chronic systolic CHF, severe MR, chronic A. fib on anticoagulation, supratherapeutic INR.   Continue with the Lasix, Entresto on hold due to hypotension continue with amiodarone and statin. Supratherapeutic INR currently Coumadin on hold we will give IV vitamin K 5 mg is unable to take p.o.. See outside echo report above.  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  Patient has previously declined any invasive interventions. Cardiology consult appreciated. 2.  Acute respiratory failure possible pneumonia pulmonary consulted. Continue with the BiPAP. Antibiotic, Covid PCR negative. 3.  Hypertension currently beta-blocker, Entresto on hold. 4.  Acute kidney injury on chronic kidney disease stage IIIb Lasix infusion Entresto on hold nephrology consulted. 5.  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 6.  Abnormal liver function tests status post cholecystectomy for acute cholecystitis 2014, will follow. Possibly due to passive congestion, consult GI. DVT Prophylaxis: Supratherapeutic INR  Diet: Dietary Nutrition Supplements: Standard High Calorie Oral Supplement  DIET NO SALT ADDED (3-4 GM);   Code Status: Limited    PT/OT Eval Status:     Mendel Pinon, MD

## 2021-04-03 NOTE — PROGRESS NOTES
INPATIENT PULMONARY CRITICAL CARE PROGRESS NOTE      Reason for visit: Acute respiratory failure, pulmonary edema/volume overload, CKD with KIERSTEN    SUBJECTIVE: The patient continues to remain on BiPAP 14/8 cm H2O with FiO2 70%. SaO2 in the low to mid 90s. He remains afebrile and hemodynamically stable, he is in atrial fibrillation. Blood pressure has been low normal.  He has been placed on a Lasix drip, decreased from 10 mg/h to 5 mg/h. He is no longer on diltiazem. The patient is minimally communicative since he is on BiPAP, denies complaints except for pain in his throat. ROS was not obtainable     Physical Exam:  Blood pressure 98/67, pulse 90, temperature 97.8 °F (36.6 °C), temperature source Axillary, resp. rate 30, height 5' 8\" (1.727 m), weight 177 lb 1.6 oz (80.3 kg), SpO2 90 %.'   Constitutional: Frail and elderly, on BiPAP. No acute distress. HENT:  Oropharynx is clear and dry. Detailed exam not performed due to BiPAP, probably has dentures  Eyes:  Conjunctivae are normal. No scleral icterus. Neck:  No JVD. Cardiovascular: Atrial fibrillation, distant heart sounds. No lower extremity edema. Pulmonary/Chest: No wheezes. No rales. No accessory muscle usage or stridor. Abdominal:  Deferred. Musculoskeletal: No cyanosis. No clubbing. No obvious joint deformity. Lymphadenopathy: Deferred. Skin: Skin is warm and dry. No rash or nodules on the exposed extremities. Psychiatric: Normal mood and affect. Behavior is normal.  No anxiety. Neurologic: Alert, awake.   No obvious focal deficit, detailed exam not performed      Results:  CBC:   Recent Labs     04/01/21  0533 04/02/21  0458 04/03/21  0519   WBC 13.7* 14.4* 13.8*   HGB 14.8 14.7 15.0   HCT 44.7 44.6 44.9   .5* 105.3* 105.4*    206 197     BMP:   Recent Labs     04/01/21  0533 04/02/21 0458 04/03/21  0519    140 143   K 4.6 5.0 4.4    107 108   CO2 18* 18* 21   BUN 55* 74* 97*   CREATININE 2.3* 3.0* 3.6* LIVER PROFILE:   Recent Labs     04/01/21  0533 04/02/21  0458 04/03/21  0519   AST 48* 127* 147*   ALT 37 99* 117*   BILITOT 2.7* 1.8* 1.4*   ALKPHOS 225* 206* 221*     PT/INR:   Recent Labs     04/01/21  0533 04/02/21  0458 04/03/21  0519   PROTIME 49.6* 73.6* 98.2*   INR 4.21* 6.23* 8.29*       Imaging:  I have reviewed radiology images personally. XR CHEST PORTABLE   Final Result   Worsening multifocal airspace opacities and new pleural effusions. CT CHEST ABDOMEN PELVIS WO CONTRAST   Final Result   1. No evidence of acute traumatic injury in the chest, abdomen or pelvis. 2. Extensive bilateral ground-glass opacities in both lungs. Imaging   features can be seen with COVID-19 pneumonia, though are nonspecific and can   occur with a variety of infectious and noninfectious processes. PneInd   3. Cardiomegaly with coronary artery disease. 4. Small bilateral pleural effusions. 5. Multinodular thyroid goiter with asymmetric enlargement of the left   thyroid and rightward shift of the trachea. Thyroid nodules measure up to   5.1 cm. Please see follow-up recommendations below. RECOMMENDATIONS:   5.1 cm incidental thyroid nodule with heterogeneous and enlarged thyroid. Recommend thyroid US if clinically warranted given patient age. Reference: J Am Renetta Radiol. 2015 Feb;12(2): 143-50         XR CHEST PORTABLE   Final Result   1. Right upper lobe airspace disease has the appearance of pneumonia, with   atelectasis or pneumonia in the right base and left upper lobe   2. Cardiomegaly with pulmonary vascular congestion but no definite findings   of pulmonary edema   3.  Large thyroid goiter         XR CHEST PORTABLE    (Results Pending)     Xr Chest Portable    Result Date: 3/31/2021  EXAMINATION: ONE XRAY VIEW OF THE CHEST 3/31/2021 9:03 pm COMPARISON: 10/30/2011 HISTORY: ORDERING SYSTEM PROVIDED HISTORY: shortness of breath TECHNOLOGIST PROVIDED HISTORY: Reason for exam:->shortness of breath does have poor LVEF and severe MR. Infection appears less likely  CKD/KIERSTEN. On Lasix drip with improved urine output, but worsening BUN and creatinine. Rate decreased. Severe MR, acute on chronic systolic CHF. Cardiology following  Atrial fibrillation on chronic anticoagulation, supratherapeutic INR. Cardiology/EP following. Amiodarone held, echocardiogram pending  Elevated troponin. Probably related to renal insufficiency  Leukocytosis. Unclear whether he has an infection such as pneumonia. Procalcitonin not significantly elevated  Abnormal LFT. Possibly congestive  Macrocytosis. Per IM, normal B12 and folate levels  HTN, HLD, hyperthyroidism, vitamin D deficiency. Per IM. DVT prophylaxis. INR elevated. Discussed with patient, nursing, Dr. Ebony Flor.   Prognosis guarded.         Electronically signed by:  Akanksha Martino MD    4/3/2021    3:49 PM.

## 2021-04-03 NOTE — PROGRESS NOTES
04/03/21 0325   NIV Type   Skin Protection for O2 Device Yes   Location Nose   Equipment Type v60   Mode Bilevel   Mask Type Full face mask   Mask Size Large   Settings/Measurements   IPAP 14 cmH20   CPAP/EPAP 8 cmH2O   Rate Ordered 6   Resp 30   FiO2  70 %   Vt Exhaled 644 mL   Minute Volume 19 Liters   Mask Leak (lpm) 0 lpm   Comfort Level Good   Using Accessory Muscles No   SpO2 92   Alarm Settings   Alarms On Y   Press Low Alarm 6 cmH2O   High Pressure Alarm 30 cmH2O   Apnea (secs) 20 secs   Resp Rate Low Alarm 6   High Respiratory Rate 45 br/min

## 2021-04-03 NOTE — PROGRESS NOTES
04/03/21 0019   NIV Type   $NIV $Daily Charge   Skin Protection for O2 Device Yes   Equipment Type v60   Mode Bilevel   Mask Type Full face mask   Mask Size Large   Settings/Measurements   IPAP 14 cmH20   CPAP/EPAP 8 cmH2O   Rate Ordered 6   Resp (!) 32   FiO2  40 %   Vt Exhaled 459 mL   Minute Volume 18 Liters   Mask Leak (lpm) 46 lpm   Comfort Level Good   Using Accessory Muscles No   SpO2 94   Alarm Settings   Alarms On Y   Press Low Alarm 6 cmH2O   High Pressure Alarm 30 cmH2O   Apnea (secs) 20 secs   Resp Rate Low Alarm 6   High Respiratory Rate 45 br/min

## 2021-04-03 NOTE — PROGRESS NOTES
[facility]     Cardiology                                     Progress Note    Admission date:  3/31/2021    Reason for follow up visit: CHF, afib    HPI/CC: Tuan Reasons was admitted on 3/31/2021 with a fall. Has been treated for A/CKD, CHF, and resp failure. IV Cardizem was stopped due to hypotension. Rhythm has been afib with an average HR of 90. Subjective: He is drowsy, on BiPAP. Shakes head no when asked if having chest pain. Vitals:  Blood pressure (!) 93/59, pulse 91, temperature 97.6 °F (36.4 °C), temperature source Axillary, resp. rate 28, height 5' 8\" (1.727 m), weight 177 lb 1.6 oz (80.3 kg), SpO2 91 %.   Temp  Av.9 °F (36.6 °C)  Min: 97.5 °F (36.4 °C)  Max: 99 °F (37.2 °C)  Pulse  Av.8  Min: 71  Max: 95  BP  Min: 78/51  Max: 110/68  SpO2  Av.4 %  Min: 90 %  Max: 93 %  FiO2   Av.2 %  Min: 65 %  Max: 70 %    24 hour I/O    Intake/Output Summary (Last 24 hours) at 4/3/2021 1048  Last data filed at 4/3/2021 9238  Gross per 24 hour   Intake 564.59 ml   Output 2105 ml   Net -1540.41 ml     Current Facility-Administered Medications   Medication Dose Route Frequency Provider Last Rate Last Admin    phytonadione (ADULT) (VITAMIN K) 5 mg in dextrose 5 % 100 mL IVPB  5 mg Intravenous Once Deisi Kyle  mL/hr at 21 1025 5 mg at 21 1025    amiodarone (CORDARONE) tablet 200 mg  200 mg Oral Daily Rosas Moss MD   Stopped at 21 0920    atorvastatin (LIPITOR) tablet 20 mg  20 mg Oral Daily Rosas Moss MD   Stopped at 21 1027    methIMAzole (TAPAZOLE) tablet 10 mg  10 mg Oral TID Rosas Moss MD   Stopped at 21 8900    calcitRIOL (ROCALTROL) capsule 0.25 mcg  0.25 mcg Oral Daily Rosas Moss MD   Stopped at 21    psyllium (METAMUCIL) 58.12 % packet 1 packet  1 packet Oral Daily Rosas Moss MD   Stopped at 21    sodium chloride flush 0.9 % injection 10 mL  10 mL Intravenous PRN Rubi Esparza MD        0.9 % sodium chloride infusion  25 mL Intravenous PRN Rubi Esparza MD        promethazine (PHENERGAN) tablet 12.5 mg  12.5 mg Oral Q6H PRN Rubi Esparza MD        Or    ondansetron [facility]) injection 4 mg  4 mg Intravenous Q6H PRN Rubi Esparza MD        acetaminophen (TYLENOL) tablet 650 mg  650 mg Oral Q6H PRN Rubi Esparza MD        Or    acetaminophen (TYLENOL) suppository 650 mg  650 mg Rectal Q6H PRN Rubi Esparza MD        melatonin tablet 3 mg  3 mg Oral Nightly PRN Rubi Esparza MD        cefTRIAXone (ROCEPHIN) 1000 mg IVPB in 50 mL D5W minibag  1,000 mg Intravenous Q24H Rubi Esparza  mL/hr at 04/03/21 1042 1,000 mg at 04/03/21 1042    azithromycin (ZITHROMAX) 500 mg in D5W 250ml addavial  500 mg Intravenous Q24H Rubi Esparza  mL/hr at 04/03/21 1011 500 mg at 04/03/21 1011    warfarin (COUMADIN) daily dosing (placeholder)   Other RX Placeholder Melisa Luu MD        furosemide (LASIX) 100 mg in dextrose 5 % 100 mL infusion  5 mg/hr Intravenous Continuous Sparkle S Putcitlalliff, APRN - CNP 5 mL/hr at 04/03/21 0023 5 mg/hr at 04/03/21 0023    dilTIAZem 125 mg in dextrose 5 % 125 mL infusion  5-15 mg/hr Intravenous Continuous Ressie Imam, APRN - CNP   Stopped at 04/02/21 1236    perflutren lipid microspheres (DEFINITY) injection 1.65 mg  1.5 mL Intravenous ONCE PRN Karie Stanley MD           Objective:     Telemetry monitor: afib    Physical Exam:  Constitutional and general appearance: fatigued, ill appearing, NAD, appears stated age and pale  HEENT: PERRL, no cervical lymphadenopathy. No masses palpable.  Normal oral mucosa  Respiratory:  · Normal excursion and expansion with use of accessory muscles  · Resp auscultation: Diminished breath sounds without wheezing, rhonchi, and rales  Cardiovascular:  · The apical impulse is not displaced  · Heart tones are crisp and normal. irregular S1 and S2.  · Jugular venous pulsation elevated to neck  · The carotid upstroke is normal in amplitude and contour without delay or bruit  · Peripheral pulses are symmetrical and full   Abdomen:  · No masses or tenderness  · Bowel sounds present  Extremities:  ·  No cyanosis or clubbing  ·  Trace lower extremity edema  ·  Skin: warm and dry  Neurological:  · Drowsy   · Unable to fully assess    Data    Echo 11/2/2020 ([facility]): The left atrium is severely dilated. The left ventricle is mildly dilated. Wall thickness at upper limits of normal.  Atrial fibrillation with a ventricular rate of 102 beats per minute. Overall left ventricular ejection fraction is estimated to be 25-30%. The left ventricular function is severely reduced. There is moderate to severe global hypokinesis of the left ventricle. There is an abnormal contraction sequence due to intraventricular conduction  defect. The Aortic Valve leaflets appear sclerotic. Mild - moderate tricuspid regurgitation is present. Mild to moderate pulmonic valvular regurgitation. The right ventricular systolic function is mildly reduced. The right atrium is mildly dilated. There is moderate to severe mitral regurgitation. The mitral regurgitant jet is anteriorly directed, which is consistent with  posterior leaflet pathology. Trace aortic regurgitation. The mitral valve leaflets are mildly myxomatous in appearance. There is mild mitral valve prolapse. LV thrombus can not be excluded. The pulmonary artery is not well visualized. All labs and testing reviewed.   Lab Review     Renal Profile:   Lab Results   Component Value Date    CREATININE 3.6 04/03/2021    BUN 97 04/03/2021     04/03/2021    K 4.4 04/03/2021     04/03/2021    CO2 21 04/03/2021     CBC:    Lab Results   Component Value Date    WBC 13.8 04/03/2021    RBC 4.26 04/03/2021    HGB 15.0 04/03/2021    HCT 44.9 04/03/2021    .4 04/03/2021    RDW 13.8 04/03/2021     04/03/2021     BNP:  No results found for: BNP  Fasting Lipid Panel:    Lab Results   Component Value Date    CHOL 142 07/16/2014    HDL 39 07/16/2014    TRIG 162 07/16/2014     Cardiac Enzymes:  CK/MbTroponin  Lab Results   Component Value Date    CKTOTAL 188 03/31/2021    TROPONINI 0.04 04/01/2021     PT/ INR   Lab Results   Component Value Date    INR 8.29 04/03/2021    INR 6.23 04/02/2021    INR 4.21 04/01/2021    PROTIME 98.2 04/03/2021    PROTIME 73.6 04/02/2021    PROTIME 49.6 04/01/2021     PTT No results found for: PTT   Lab Results   Component Value Date    MG 2.90 04/03/2021      Lab Results   Component Value Date    TSH 1.41 04/01/2021       Assessment:  Acute on chronic systolic CHF/volume overload: ongoing   -adm weight 180 lbs (177 today)   -net -2.2L this admission   Xardiomyopathy: etiology unknown   -per outside records, patient declined further testing  Permanent atrial fibrillation: stable   -HR controlled   -on amiodarone for rate control per primary cardiologist    -[id number] score over 2  LBBB: stable   HTN: hypotensive this admission  Mitral regurgitation: mod-severe on echo 11/2020  Acute respiratory failure   Acute on chronic renal failure: worsening   Abnormal liver function   Hyperthyroidism: on methimazole   Macrocytosis   Supratherapeutic INR     Plan:   1. Restart home statin when liver function improves   2. Would not resume amiodarone when taking po due to hyperthyroidism   3. Can restart IV Cardizem if needed for HR sustaining over 110  4. Restart Coumadin when INR stable/takin gpo   5. Lasix gtt per nephrology   6. Vitamin K today per primary   7. Limited echo was ordered 4/1/2021   8. No ACE/ARB with renal failure/hypotension   9. No beta blocker due to hypotension  10. Antibiotics per primary     Patient is typically followed by Dr. Vivian Neal.     NHI Sanchez-CNP  [employer]  [phone]

## 2021-04-03 NOTE — PROGRESS NOTES
Pts son came to visit this afternoon. Pt was completely alert and orient. Pt requested to be off bipap for awhile. Mouth and throat very dry despite oral care. Placed pt on hi flow at 15 liters. Pt was able to drink his soda and cough and clear thick mucus pluggings. Pt is a heavy mouth breather and demonstrated accessory muscle use. Pt began to desat into the 80's. Non rebreather placed. Pt did not want to go back on the bipap. He and his son were watching a sports game and content despite his mild physical struggle to breath. Pt was able to maintain o2 sats > 90 on both the nr and hf at 15liters each. Pt removes the nr to blow cough clear and drink independently. Pt was commenting on the sports game. He did well with this therapy. Reviewed md notes and called out to dr Sarah Hill to review the above. Ok to use hf and nr. He stated pts co2 was not bad.

## 2021-04-03 NOTE — PROGRESS NOTES
04/03/21 1213   NIV Type   Skin Assessment Clean, dry, & intact   Skin Protection for O2 Device Yes   Location Nose   Equipment Changed Mask   NIV Started/Stopped On   Equipment Type v60   Mode Bilevel   Mask Type Full face mask   Mask Size Large   Settings/Measurements   IPAP 14 cmH20   CPAP/EPAP 8 cmH2O   Resp 26   FiO2  70 %   Vt Exhaled 685 mL   Minute Volume 17.9 Liters   Mask Leak (lpm) 0 lpm   Comfort Level Good   Using Accessory Muscles No   Breath Sounds   Right Upper Lobe Diminished   Right Middle Lobe Diminished   Right Lower Lobe Diminished   Left Upper Lobe Diminished   Left Lower Lobe Diminished   Patient Observation   Observations mepilex on nsoe   Alarm Settings   Alarms On Y   Press Low Alarm 6 cmH2O   High Pressure Alarm 30 cmH2O   Apnea (secs) 20 secs   Resp Rate Low Alarm 6   High Respiratory Rate 45 br/min

## 2021-04-03 NOTE — PROGRESS NOTES
Patient blood pressure has been on the soft side. Patient is having a hard time with BIPAP. He is complaining it is uncomfortable and pulling at it at times. Patient turned every few hours. Alba catheter draining adequate output. Patient has only had a few sips of water that mostly came out the side of mouth. Pt complains of pain when swallowing. Will continue to monitor.

## 2021-04-03 NOTE — PROGRESS NOTES
[facility]       Progress Note           Subjective/   80y.o. year old male who we are seeing in consultation for A/CKD. HPI:  Renal function slowly worsening, urine output picking up, 2.1 liters over last 24 hours. Remains sob, hypoxic requiring bi-pap. ROS:  Intake reduced, +weak.     Objective/   GEN:  Chronically ill, BP 98/67   Pulse 89   Temp 97.6 °F (36.4 °C) (Axillary)   Resp 26   Ht 5' 8\" (1.727 m)   Wt 177 lb 1.6 oz (80.3 kg)   SpO2 91%   BMI 26.93 kg/m²   HEENT: non-icteric, no JVD  CV: S1, S2, irregular without m/r/g; no LE edema  RESP: Diminished bilaterally without w/r/r; on bi-level  ABD: +bs, soft, nt, no hsm  SKIN: warm, no rashes    Data/  Recent Labs     04/01/21 0533 04/02/21 0458 04/03/21  0519   WBC 13.7* 14.4* 13.8*   HGB 14.8 14.7 15.0   HCT 44.7 44.6 44.9   .5* 105.3* 105.4*    206 197     Recent Labs     04/01/21 0533 04/02/21  0458 04/03/21  0519    140 143   K 4.6 5.0 4.4    107 108   CO2 18* 18* 21   GLUCOSE 100* 110* 98   MG 2.60* 2.70* 2.90*   BUN 55* 74* 97*   CREATININE 2.3* 3.0* 3.6*   LABGLOM 27* 20* 16*   GFRAA 33* 24* 19*       Assessment/     - Acute on chronic sCHF with severe mitral insufficiency     - Acute on chronic kidney disease stage 4              SCr 2.3 on admission, up to 3.6, previously 1.8 from Jan 2021     - Atrial fibrillation - chronic     - Anion-gap metabolic acidosis    - Coagulopathy    Plan/      - Continue lasix drip  - Trend labs, bp's, urine output  - Would not pursue dialysis if needed     Case d/w daughter - agrees with no plans for any aggressive measures including CPR,   ventilator, or dialysis  Case d/w Dr. Nona Callejas

## 2021-04-04 ENCOUNTER — APPOINTMENT (OUTPATIENT)
Dept: GENERAL RADIOLOGY | Age: 86
DRG: 871 | End: 2021-04-04
Payer: MEDICARE

## 2021-04-04 LAB
A/G RATIO: 0.9 (ref 1.1–2.2)
ALBUMIN SERPL-MCNC: 2.5 G/DL (ref 3.4–5)
ALP BLD-CCNC: 259 U/L (ref 40–129)
ALT SERPL-CCNC: 130 U/L (ref 10–40)
ANION GAP SERPL CALCULATED.3IONS-SCNC: 14 MMOL/L (ref 3–16)
AST SERPL-CCNC: 179 U/L (ref 15–37)
BASOPHILS ABSOLUTE: 0 K/UL (ref 0–0.2)
BASOPHILS RELATIVE PERCENT: 0.1 %
BILIRUB SERPL-MCNC: 1.7 MG/DL (ref 0–1)
BLOOD CULTURE, ROUTINE: NORMAL
BUN BLDV-MCNC: 110 MG/DL (ref 7–20)
CALCIUM SERPL-MCNC: 8.2 MG/DL (ref 8.3–10.6)
CHLORIDE BLD-SCNC: 108 MMOL/L (ref 99–110)
CO2: 20 MMOL/L (ref 21–32)
CREAT SERPL-MCNC: 3.4 MG/DL (ref 0.8–1.3)
CULTURE, BLOOD 2: NORMAL
EOSINOPHILS ABSOLUTE: 0 K/UL (ref 0–0.6)
EOSINOPHILS RELATIVE PERCENT: 0.1 %
GFR AFRICAN AMERICAN: 21
GFR NON-AFRICAN AMERICAN: 17
GLOBULIN: 2.9 G/DL
GLUCOSE BLD-MCNC: 114 MG/DL (ref 70–99)
HCT VFR BLD CALC: 42.9 % (ref 40.5–52.5)
HEMOGLOBIN: 14.4 G/DL (ref 13.5–17.5)
INR BLD: 1.83 (ref 0.86–1.14)
LYMPHOCYTES ABSOLUTE: 0.8 K/UL (ref 1–5.1)
LYMPHOCYTES RELATIVE PERCENT: 5.7 %
MAGNESIUM: 2.9 MG/DL (ref 1.8–2.4)
MCH RBC QN AUTO: 34.8 PG (ref 26–34)
MCHC RBC AUTO-ENTMCNC: 33.5 G/DL (ref 31–36)
MCV RBC AUTO: 103.8 FL (ref 80–100)
MONOCYTES ABSOLUTE: 0.7 K/UL (ref 0–1.3)
MONOCYTES RELATIVE PERCENT: 4.8 %
NEUTROPHILS ABSOLUTE: 12.1 K/UL (ref 1.7–7.7)
NEUTROPHILS RELATIVE PERCENT: 89.3 %
PDW BLD-RTO: 13.8 % (ref 12.4–15.4)
PLATELET # BLD: 198 K/UL (ref 135–450)
PMV BLD AUTO: 9.3 FL (ref 5–10.5)
POTASSIUM SERPL-SCNC: 4 MMOL/L (ref 3.5–5.1)
PROTHROMBIN TIME: 21.3 SEC (ref 10–13.2)
RBC # BLD: 4.14 M/UL (ref 4.2–5.9)
SODIUM BLD-SCNC: 142 MMOL/L (ref 136–145)
TOTAL PROTEIN: 5.4 G/DL (ref 6.4–8.2)
WBC # BLD: 13.5 K/UL (ref 4–11)

## 2021-04-04 PROCEDURE — 80053 COMPREHEN METABOLIC PANEL: CPT

## 2021-04-04 PROCEDURE — 2580000003 HC RX 258: Performed by: INTERNAL MEDICINE

## 2021-04-04 PROCEDURE — 86709 HEPATITIS A IGM ANTIBODY: CPT

## 2021-04-04 PROCEDURE — 85025 COMPLETE CBC W/AUTO DIFF WBC: CPT

## 2021-04-04 PROCEDURE — 86038 ANTINUCLEAR ANTIBODIES: CPT

## 2021-04-04 PROCEDURE — 6360000002 HC RX W HCPCS: Performed by: NURSE PRACTITIONER

## 2021-04-04 PROCEDURE — 99232 SBSQ HOSP IP/OBS MODERATE 35: CPT | Performed by: INTERNAL MEDICINE

## 2021-04-04 PROCEDURE — 94761 N-INVAS EAR/PLS OXIMETRY MLT: CPT

## 2021-04-04 PROCEDURE — 86803 HEPATITIS C AB TEST: CPT

## 2021-04-04 PROCEDURE — 71045 X-RAY EXAM CHEST 1 VIEW: CPT

## 2021-04-04 PROCEDURE — 83516 IMMUNOASSAY NONANTIBODY: CPT

## 2021-04-04 PROCEDURE — 99221 1ST HOSP IP/OBS SF/LOW 40: CPT | Performed by: INTERNAL MEDICINE

## 2021-04-04 PROCEDURE — 6370000000 HC RX 637 (ALT 250 FOR IP): Performed by: PHYSICIAN ASSISTANT

## 2021-04-04 PROCEDURE — 2700000000 HC OXYGEN THERAPY PER DAY

## 2021-04-04 PROCEDURE — 84443 ASSAY THYROID STIM HORMONE: CPT

## 2021-04-04 PROCEDURE — 99233 SBSQ HOSP IP/OBS HIGH 50: CPT | Performed by: NURSE PRACTITIONER

## 2021-04-04 PROCEDURE — 36415 COLL VENOUS BLD VENIPUNCTURE: CPT

## 2021-04-04 PROCEDURE — 87340 HEPATITIS B SURFACE AG IA: CPT

## 2021-04-04 PROCEDURE — 86704 HEP B CORE ANTIBODY TOTAL: CPT

## 2021-04-04 PROCEDURE — 86708 HEPATITIS A ANTIBODY: CPT

## 2021-04-04 PROCEDURE — 86706 HEP B SURFACE ANTIBODY: CPT

## 2021-04-04 PROCEDURE — 6370000000 HC RX 637 (ALT 250 FOR IP): Performed by: INTERNAL MEDICINE

## 2021-04-04 PROCEDURE — 6360000002 HC RX W HCPCS: Performed by: INTERNAL MEDICINE

## 2021-04-04 PROCEDURE — 2060000000 HC ICU INTERMEDIATE R&B

## 2021-04-04 PROCEDURE — 83735 ASSAY OF MAGNESIUM: CPT

## 2021-04-04 PROCEDURE — 2580000003 HC RX 258: Performed by: NURSE PRACTITIONER

## 2021-04-04 PROCEDURE — 85610 PROTHROMBIN TIME: CPT

## 2021-04-04 RX ORDER — WARFARIN SODIUM 5 MG/1
5 TABLET ORAL DAILY
Status: COMPLETED | OUTPATIENT
Start: 2021-04-04 | End: 2021-04-04

## 2021-04-04 RX ORDER — MIDODRINE HYDROCHLORIDE 5 MG/1
5 TABLET ORAL ONCE
Status: COMPLETED | OUTPATIENT
Start: 2021-04-04 | End: 2021-04-04

## 2021-04-04 RX ADMIN — METHIMAZOLE 10 MG: 5 TABLET ORAL at 15:16

## 2021-04-04 RX ADMIN — METHIMAZOLE 10 MG: 5 TABLET ORAL at 09:12

## 2021-04-04 RX ADMIN — FUROSEMIDE 5 MG/HR: 10 INJECTION, SOLUTION INTRAMUSCULAR; INTRAVENOUS at 02:39

## 2021-04-04 RX ADMIN — AMIODARONE HYDROCHLORIDE 200 MG: 200 TABLET ORAL at 08:37

## 2021-04-04 RX ADMIN — MIDODRINE HYDROCHLORIDE 5 MG: 5 TABLET ORAL at 03:27

## 2021-04-04 RX ADMIN — PSYLLIUM HUSK 1 PACKET: 3.4 POWDER ORAL at 08:37

## 2021-04-04 RX ADMIN — ATORVASTATIN CALCIUM 20 MG: 10 TABLET, FILM COATED ORAL at 08:37

## 2021-04-04 RX ADMIN — CALCITRIOL 0.25 MCG: 0.25 CAPSULE ORAL at 08:37

## 2021-04-04 RX ADMIN — CEFTRIAXONE SODIUM 1000 MG: 1 INJECTION, POWDER, FOR SOLUTION INTRAMUSCULAR; INTRAVENOUS at 08:34

## 2021-04-04 RX ADMIN — METHIMAZOLE 10 MG: 5 TABLET ORAL at 21:03

## 2021-04-04 RX ADMIN — AZITHROMYCIN MONOHYDRATE 500 MG: 500 INJECTION, POWDER, LYOPHILIZED, FOR SOLUTION INTRAVENOUS at 09:12

## 2021-04-04 RX ADMIN — WARFARIN SODIUM 5 MG: 5 TABLET ORAL at 16:53

## 2021-04-04 ASSESSMENT — PAIN SCALES - GENERAL: PAINLEVEL_OUTOF10: 0

## 2021-04-04 NOTE — PROGRESS NOTES
Mirian Singh notified that pt was bi-pap taken off  continuous  and placed on 15L HF NC and 15L non-rebreather because morning RN stated pt was unable to tolerate the bi-pap. pt O2 sat on the non-rebreather and HF combo is 88-90% however Charge RN stated to call to see if pt should be place on Vapotherm instead of HF and non-rebreather. ? If you think he could tolerate the vapotherm better than bi-pap. please call to advise on this. Thank you.  Carlitos Durham RN

## 2021-04-04 NOTE — PROGRESS NOTES
Spoke with Dr. Lucila Ye and she spoke with pt. Son at bedside regarding goals of care. Son possible interested in hospice. Decision likely to be made tomorrow when pt. Daughter, POA, comes in. Dr. Lucila Ye directed me to call pulmonology to see if he recommends placing pt. On bi-pap.

## 2021-04-04 NOTE — PROGRESS NOTES
Pharmacy Note  Warfarin Consult  Dx: AFib  [id number] Score for Atrial Fibrillation Stroke Risk 3  Goal INR range 2-3   Home Warfarin dose: 2.5 mg daily except 5mg twice a week     Date                 INR                 Warfarin  4/1                   4.21                 Hold  4/2   6.23  Hold  4/3                   8.29                 Hold  (Vit K 5 mg)  4/4                   1.83                 5 mg    Recommend warfarin 5 mg x 1 dose. Vitamin K 5 mg given on 4/3. Daily INR ordered. Rx will continue to manage therapy per consult order. JHONNY Campos. Ph. 4/4/2021 8:05 AM

## 2021-04-04 NOTE — PROGRESS NOTES
INPATIENT PULMONARY CRITICAL CARE PROGRESS NOTE      Reason for visit: Acute respiratory failure, pulmonary edema/volume overload, CKD with KIERSTEN    SUBJECTIVE: The patient would not wear BiPAP from yesterday evening onward. He was placed on high flow nasal cannula and mask. He is still being diuresed with Lasix at 5 mg/h. Last night he was placed on Vapotherm with FiO2 100%, flow 40 LPM.  SaO2 in the low 90s. He is awake, mildly drowsy but conversant. Son at bedside. He remains afebrile and hemodynamically stable, he is in atrial fibrillation. Blood pressure has been low normal.  ROS was not obtainable     Physical Exam:  Blood pressure (!) 87/57, pulse 87, temperature 97.6 °F (36.4 °C), temperature source Oral, resp. rate 20, height 5' 8\" (1.727 m), weight 167 lb 11.2 oz (76.1 kg), SpO2 91 %.'   Constitutional: Frail and elderly, on Vapotherm, in mild respiratory distress. HENT:  Oropharynx is clear and dry. Dry mucosa, no obvious lesions   Eyes:  Conjunctivae are normal. No scleral icterus. Neck:  No JVD. Cardiovascular: Atrial fibrillation, distant heart sounds. No lower extremity edema. Pulmonary/Chest: No wheezes. Few scattered rales. No accessory muscle usage or stridor. Abdominal:  Deferred. Musculoskeletal: No cyanosis. No clubbing. No obvious joint deformity. Lymphadenopathy: Deferred. Skin: Skin is warm and dry. No rash or nodules on the exposed extremities. Psychiatric: Normal mood and affect. Behavior is normal.  No anxiety. Neurologic: Awake but mildly confused, slightly garbled speech.   No obvious focal deficit, detailed exam not performed      Results:  CBC:   Recent Labs     04/02/21  0458 04/03/21  0519 04/04/21  0511   WBC 14.4* 13.8* 13.5*   HGB 14.7 15.0 14.4   HCT 44.6 44.9 42.9   .3* 105.4* 103.8*    197 198     BMP:   Recent Labs     04/02/21  0458 04/03/21  0519 04/04/21  0511    143 142   K 5.0 4.4 4.0    108 108   CO2 18* 21 20* SYSTEM PROVIDED HISTORY: shortness of breath TECHNOLOGIST PROVIDED HISTORY: Reason for exam:->shortness of breath Reason for Exam: sob Acuity: Acute Type of Exam: Initial FINDINGS: Cardiomegaly. Prominent central pulmonary vessels. Large thoracic inlet mass displacing the trachea to the right. Airspace disease in the right upper lobe. Strandy opacity in the right base. Strandy opacity in the left upper lobe. Costophrenic angles sharp     1. Right upper lobe airspace disease has the appearance of pneumonia, with atelectasis or pneumonia in the right base and left upper lobe 2. Cardiomegaly with pulmonary vascular congestion but no definite findings of pulmonary edema 3. Large thyroid goiter     Ct Chest Abdomen Pelvis Wo Contrast    Result Date: 4/2/2021  EXAMINATION: CT OF THE CHEST, ABDOMEN, AND PELVIS WITHOUT CONTRAST, 3/31/2021 10:27 pm TECHNIQUE: CT of the chest, abdomen and pelvis was performed without the administration of intravenous contrast. Multiplanar reformatted images are provided for review. Dose modulation, iterative reconstruction, and/or weight based adjustment of the mA/kV was utilized to reduce the radiation dose to as low as reasonably achievable. COMPARISON: CT of the abdomen and pelvis, 01/09/2014 HISTORY: ORDERING SYSTEM PROVIDED HISTORY: SOB/fall TECHNOLOGIST PROVIDED HISTORY: Reason for exam: SOB/fall Additional Contrast?  None Decision Support Exception:   Emergency Medical Condition (MA) Reason for Exam:  Shortness of breath, fall. Acuity: Acute Type of Exam: Initial Relevant Medical/Surgical History:  Hx of CHF FINDINGS: Chest: Mediastinum: Enlarged multinodular thyroid with heterogeneous nodules measuring up to 5.1 cm. There is associated rightward shift of the trachea secondary to mass effect from the thyroid. The heart is enlarged. There is coronary artery disease. Normal caliber of the aorta.  Lungs/pleura: Extensive patchy bilateral ground-glass opacities are suspicious for infection. Small bilateral pleural effusions. Soft Tissues/Bones: No acute bony or soft tissue findings. Abdomen/Pelvis: Organs: The solid organs are incompletely evaluated without intravenous contrast.  The liver, spleen, pancreas, kidneys and adrenal glands are without acute findings. Status post cholecystectomy. GI/Bowel: No mechanical bowel obstruction. Normal appendix. Diverticulosis without evidence of acute diverticulitis. Pelvis: Limited evaluation of the pelvic soft tissues secondary to metallic streak artifact from bilateral total hip arthroplasties. Peritoneum/Retroperitoneum: Severe atherosclerotic plaque. No evidence for lymphadenopathy. No hemoperitoneum or pneumoperitoneum. No mesenteric hematoma. Bones/Soft Tissues: No acute or aggressive osseous lesions. Grade 1 anterolisthesis of L5 on S1 with associated degenerative change. 1. No evidence of acute traumatic injury in the chest, abdomen or pelvis. 2. Extensive bilateral ground-glass opacities in both lungs. Imaging features can be seen with COVID-19 pneumonia, though are nonspecific and can occur with a variety of infectious and noninfectious processes. PneInd 3. Cardiomegaly with coronary artery disease. 4. Small bilateral pleural effusions. 5. Multinodular thyroid goiter with asymmetric enlargement of the left thyroid and rightward shift of the trachea. Thyroid nodules measure up to 5.1 cm. Please see follow-up recommendations below. RECOMMENDATIONS: 5.1 cm incidental thyroid nodule with heterogeneous and enlarged thyroid. Recommend thyroid US if clinically warranted given patient age. Reference: J Am Renetta Radiol. 2015 Feb;12(2): 143-50     Assessment and plan:  Acute respiratory failure, hypoxemic. On Vapotherm  Acute pulmonary edema. CXR probably represents bilateral edema, less likely pneumonia. On Lasix drip. He does have poor LVEF and severe MR. Infection appears less likely.   CXR not improved in spite of diuresis  CKD/KIERSTEN. On Lasix drip with improved urine output, but worsening BUN and creatinine. Severe MR, acute on chronic systolic CHF. Cardiology following  Atrial fibrillation on chronic anticoagulation, supratherapeutic INR. Cardiology/EP following. Amiodarone held, echocardiogram with EF 20 to 25%, moderate MR  Elevated troponin. Probably related to renal insufficiency  Leukocytosis. Unclear whether he has an infection such as pneumonia. Procalcitonin not significantly elevated  Abnormal LFT. Possibly congestive  Macrocytosis. Per IM, normal B12 and folate levels  HTN, HLD, hyperthyroidism, vitamin D deficiency. Per IM. DVT prophylaxis. INR elevated. Discussed with patient, his son, nursing, Dr. Ashley Flanagan. Discussed via telephone with his daughter, who is his healthcare power of  and her nurse. She mentions that they would consider hospice care, but it appears the patient is not ready for it.   Prognosis guarded.         Electronically signed by:  Jordon Oneal MD    4/4/2021    2:56 PM.

## 2021-04-04 NOTE — PROGRESS NOTES
Spoke with Dr. Viral Li, pulmonology, regarding pt. O2 sats around 85% maxed out on vapotherm. He stated that we could place pt. On bi-pap if he was agreeable otherwise just leave him on vapotherm. Pt. And his son, at bedside, do not want pt. To be placed on bi-pap.

## 2021-04-04 NOTE — PROGRESS NOTES
[facility]       Progress Note           Subjective/   80y.o. year old male who we are seeing in consultation for A/CKD. HPI:  Renal function slowly worsening, urine output picking up, 2.1 liters over last 24 hours. Remains sob, hypoxic requiring bi-pap, now on vapotherm. ROS:  Intake reduced, +weak.     Objective/   GEN:  Chronically ill, BP (!) 92/57   Pulse 89   Temp 97.9 °F (36.6 °C) (Axillary)   Resp 20   Ht 5' 8\" (1.727 m)   Wt 167 lb 11.2 oz (76.1 kg)   SpO2 90%   BMI 25.50 kg/m²   HEENT: non-icteric, no JVD  CV: S1, S2, irregular without m/r/g; no LE edema  RESP: Diminished bilaterally without w/r/r; on vapotherm  ABD: +bs, soft, nt, no hsm  SKIN: warm, no rashes    Data/  Recent Labs     04/02/21 0458 04/03/21  0519 04/04/21  0511   WBC 14.4* 13.8* 13.5*   HGB 14.7 15.0 14.4   HCT 44.6 44.9 42.9   .3* 105.4* 103.8*    197 198     Recent Labs     04/02/21 0458 04/03/21  0519 04/04/21  0511    143 142   K 5.0 4.4 4.0    108 108   CO2 18* 21 20*   GLUCOSE 110* 98 114*   MG 2.70* 2.90* 2.90*   BUN 74* 97* 110*   CREATININE 3.0* 3.6* 3.4*   LABGLOM 20* 16* 17*   GFRAA 24* 19* 21*       Assessment/     - Acute on chronic sCHF with severe mitral insufficiency   EF 20-25%, moderate MR     - Acute on chronic kidney disease stage 4              SCr 2.3 on admission, up to 3.6, previously 1.8 from Jan 2021     - Atrial fibrillation - chronic     - Anion-gap metabolic acidosis    - Coagulopathy    Plan/      - Continue lasix drip  - Trend labs, bp's, urine output  - Would not pursue dialysis if needed     Case d/w daughter & son - agrees with no plans for any aggressive measures including CPR,   ventilator, or dialysis  Case d/w Dr. Jonathan Ibarra

## 2021-04-04 NOTE — PROGRESS NOTES
Patient's EF (Ejection Fraction) is less than 40%    Heart Failure Medications:   Diuretics[de-identified] lasix     (One of the following REQUIRED for EF <40%/SYSTOLIC FAILURE but MAY be used in EF% >40%/DIASTOLIC FAILURE)        ACE::         ARB::         ARNI::     (Beta Blockers)   NON- Evidenced Based Beta Blocker (for EF% >40%/DIASTOLIC FAILURE): None     Evidenced Based Beta Blocker::(REQUIRED for EF% <40%/SYSTOLIC FAILURE) None  . .................................................................................................................................................. Patient's weights and intake/output reviewed: Yes    Patient's Last Weight: 167 lbs obtained by bed scale. Difference of 10 lbs less than last documented weight. Intake/Output Summary (Last 24 hours) at 4/4/2021 1727  Last data filed at 4/4/2021 1655  Gross per 24 hour   Intake 1190 ml   Output 1725 ml   Net -535 ml       Comorbidities Reviewed Yes    Patient has a past medical history of Allergic rhinitis, Atrial fibrillation ([address] CHF (congestive heart failure) ([address] Chronic kidney disease, stage III (moderate), Hyperlipidemia, Hypertension, Macular degeneration, Thyroid disease, and Vitamin D deficiency. >>For CHF and Comorbidity documentation on Education Time and Topics, please see Education Tab    Progressive Mobility Assessment:  What is this patient's Current Level of Mobility?: Requires Bed Rest  How was this patient Mobilized today?: bedrest                 With Whom? Level of Difficulty/Assistance: 2x Assist     Pt resting in bed at this time on vapotherm L O2. Pt denies shortness of breath. Pt with nonpitting lower extremity edema.      Patient and/or Family's stated Goal of Care this Admission: reduce shortness of breath, increase activity tolerance, better understand heart failure and disease management, be more comfortable and reduce lower extremity edema prior to discharge        :

## 2021-04-04 NOTE — CONSULTS
[address] ,  [address] [facility]  Phone: [phone]   BCN:[phone]  [facility],  [address] [facility], [address] [place] [address]  Phone: [phone]   JDR:[phone]    Gastroenterology H&P/Consult Note    Chief Complaint   Patient presents with    Shortness of Breath     Pt reports being SOB \"for months\" pt 86% O2 on room air at triage, 2L NC applied by writer.  Fall     Pt reports having a mechanical fall earlier today landing on his buttox, reporting L sided hip pain. denies hitting head, takes coumadin    Leg Swelling     Pt reports feet swelling starting       HPI     Thank you No ref. provider found and Peter Flores MD for asking me to see Maverick Jacobo in consultation. He is a 80y.o. year old male with medical history of chronic atrial fibrillation on Coumadin, systolic CHF (LVEF 25 to 55%) severe mitral regurgitation, hypercholesterolemia, hypertension, chronic renal insufficiency, hyperthyroidism, history of acute cholecystitis status post cholecystectomy in 2014 present on 3/31/2021 with fall and shortness of breath. Date of Admission:  3/31/2021  Date of Consultation:  4/4/2021    Patient has been treated for systolic CHF decompensation, atrial fibrillation with RVR, supratherapeutic INR 8.29 and acute hypoxemic respiratory failure on BiPAP. Course has been complicated with periods of hypotension with SBP  and DBP 52-68  prompting cessation of Cardizem drip. GI has been consulted for worsening abnormal liver chemistries. History is limited due to hypoxemia on high flow oxygen. Denies abdominal pain, nausea, vomiting, fever, chills, constipation, diarrhea, hematochezia or melenic stools. Labs reviewed indicate elevated alkaline phosphatase 259, , , total bilirubin 1.7, low albumin 2.5 and total protein 5.4. Weighted WBC 13.5, hemoglobin 14.4, hematocrit 42.9, platelets 092.   CT chest abdomen and pelvis without contrast on 3/31/2021 noted enlarged multinodular thyroid to 5.1 cm with mass-effect on trachea. Enlarged heart. Extensive bilateral groundglass opacities suspicious for infection. Limited evaluation of abdominal organs due to lack of IV contrast however unremarkable liver, spleen, pancreas, kidneys and adrenal glands. Status post cholecystectomy. Diverticulosis without diverticulitis. Last Encounter Reviewed: none  Pertinent PMH, FH, SH is reviewed below. Last EGD 5/2/2013 by Dr. Man Lawrence: Normal esophagus, stomach and duodenum with widely patent pylorus. Last Colonoscopy  5/2/2013 by Dr. Man Lawrence: Multiple diminutive sessile polyps in the ascending, transverse and sigmoid colon removed by cold biopsy forceps (path-tubular adenoma and hyperplastic polyp). Sigmoid diverticula. Normal rectum, biopsied (path-fragment of colonic mucosa with mild nonspecific changes). Internal hemorrhoids. External skin tags.     Health Maintenance   Topic Date Due    DTaP/Tdap/Td vaccine (1 - Tdap) Never done    Shingles Vaccine (2 of 3) 05/24/2015    Lipid screen  07/16/2015    Annual Wellness Visit (AWV)  Never done    TSH testing  04/01/2022    Potassium monitoring  04/04/2022    Creatinine monitoring  04/04/2022    Flu vaccine  Completed    Pneumococcal 65+ years Vaccine  Completed    COVID-19 Vaccine  Completed    Hepatitis A vaccine  Aged Out    Hepatitis B vaccine  Aged Out    Hib vaccine  Aged Out    Meningococcal (ACWY) vaccine  Aged Out     PAST MEDICAL HISTORY     Past Medical History:   Diagnosis Date    Allergic rhinitis     Atrial fibrillation ([address]     CHF (congestive heart failure) (HCC)     Chronic kidney disease, stage III (moderate)     Hyperlipidemia     Hypertension     Macular degeneration     Thyroid disease     Vitamin D deficiency      FAMILY HISTORY     Family History   Problem Relation Age of Onset    Cancer Father         bladder     SOCIAL HISTORY Social History     Tobacco Use    Smoking status: Never Smoker    Smokeless tobacco: Never Used   Substance Use Topics    Alcohol use: Yes     Alcohol/week: 0.0 standard drinks     Comment: rarely    Drug use: No     SURGICAL HISTORY     Past Surgical History:   Procedure Laterality Date    CARDIOVERSION  2006    atrial fib    COLONOSCOPY  2013    EYE SURGERY  2000    archie iol    JOINT REPLACEMENT Left 2012    hip    JOINT REPLACEMENT Right 2004    hip     ALLERGIES   No Known Allergies  CURRENT MEDICATIONS      warfarin  5 mg Oral Daily    atorvastatin  20 mg Oral Daily    methIMAzole  10 mg Oral TID    calcitRIOL  0.25 mcg Oral Daily    psyllium  1 packet Oral Daily    cefTRIAXone (ROCEPHIN) IV  1,000 mg Intravenous Q24H    azithromycin  500 mg Intravenous Q24H    warfarin (COUMADIN) daily dosing (placeholder)   Other RX Placeholder      sodium chloride      furosemide (LASIX) 1mg/ml infusion 5 mg/hr (04/04/21 0239)    dilTIAZem (CARDIZEM) 125 mg in dextrose 5% 125 mL infusion Stopped (04/02/21 1236)     sodium chloride flush, sodium chloride, promethazine **OR** ondansetron, acetaminophen **OR** acetaminophen, melatonin, perflutren lipid microspheres  HOME MEDICATIONS  [unfilled]  IMMUNIZATIONS     There is no immunization history on file for this patient. REVIEW OF SYSTEMS   See HPI for further details and pertinent postiives. Negative for the following:  Constitutional: Negative for weight change. Negative for appetite change and fatigue. HENT: Negative for nosebleeds, sore throat, mouth sores, trouble swallowing and voice change. Respiratory: Negative for cough, choking and chest tightness. Cardiovascular: Negative for chest pain   Gastrointestinal: See HPI  Musculoskeletal: Negative for arthralgias. Skin: Negative for pallor. Neurological: Negative for weakness and light-headedness. Hematological: Negative for adenopathy. Does not bruise/bleed easily. Psychiatric/Behavioral: Negative for suicidal ideas. PHYSICAL EXAM   VITAL SIGNS: BP (!) 92/57   Pulse 89   Temp 97.9 °F (36.6 °C) (Axillary)   Resp 20   Ht 5' 8\" (1.727 m)   Wt 167 lb 11.2 oz (76.1 kg)   SpO2 90%   BMI 25.50 kg/m²   Review of available records reveals: Wt Readings from Last 50 Encounters:   04/04/21 167 lb 11.2 oz (76.1 kg)   08/03/18 180 lb (81.6 kg)   02/17/14 188 lb (85.3 kg)   01/31/14 177 lb (80.3 kg)   01/28/14 178 lb (80.7 kg)   05/02/13 175 lb (79.4 kg)     Constitutional: Elderly male. Frail appearing. On high flow O2 via NC   HENT: Normocephalic, Atraumatic, Bilateral external ears normal, Oropharynx moist, Nose normal.   Eyes: Conjunctiva normal, No discharge. Neck: Normal range of motion, No tenderness, Supple, No stridor. Cardiovascular: Normal heart rate, irregular rhythm  Thorax & Lungs: decreased breath sounds bilaterally, mild respiratory distress, No wheezing, No chest tenderness. Abdomen:  normal bowel sounds, soft, non tender, non distended, no hernias  Rectal:  Deferred. Skin: Warm, Dry, No erythema, No rash. Lower Extremities: Intact distal pulses, No edema,   Neurologic: Alert & oriented x 3. RADIOLOGY/PROCEDURES     Last PALAT CXR:   Results for orders placed during the hospital encounter of 10/31/11   X-ray chest PA and lateral    Narrative    CHEST AND LATERAL 10/31/2011-     HISTORY- Amiodarone surveillance. COMPARISON- Multiple previous, most recent 11/1/2010. FINDINGS- A nearly 11 cm mediastinal mass is similar to previous. CT showed a large goiter. No pleural effusions. Heart size is normal.     No acute infiltrate. IMPRESSION- Stable chest.        Dictated on- 10/31/2011 16-32-01          Read By- Denise Michaud M.D. Released By- Denise Michaud M.D.        Released Date Time- 10/31/11 0449          Opal Joyner M.D. ------------------------------------------------------------------------------        Results for orders placed during the hospital encounter of 07/09/13   NM Gastric Emptying    Narrative Gastric emptying study     Comparison: No prior for comparison. Indication: Nausea and vomiting. Radiopharmaceutical: 1.2 mCi Tc-99m sulfur colloid was  administered orally in a solid meal.  Imaging was performed in the  anterior and posterior projections, with emptying calculated using  geometric means. Findings: The stomach is located normally in the left upper abdomen. Gastric retention at 30 minutes is 57% (normal range at 30 minutes  is >70%)  Gastric retention at 60 minutes is 31% (normal range at 60 minutes  is >30% and <90%). Gastric retention at 120 minutes is 26% (normal range at 120  minutes is <60%). Gastric retention at 180 minutes is 16% (normal range at 180  minutes is <30%). Gastric retention at 240 minutes is 10% (normal range at 240  minutes is<10%). Conclusion: Gastric emptying is rapid through 3 hours and is  borderline at 4 hours.        COURSE & MEDICAL DECISION MAKING     Lab Results   Component Value Date    WBC 13.5 (H) 04/04/2021    HGB 14.4 04/04/2021    HCT 42.9 04/04/2021     04/04/2021    CHOL 142 07/16/2014    TRIG 162 (H) 07/16/2014    HDL 39 (L) 07/16/2014     (H) 04/04/2021     (H) 04/04/2021     04/04/2021    K 4.0 04/04/2021     04/04/2021    CREATININE 3.4 (H) 04/04/2021     (HH) 04/04/2021    CO2 20 (L) 04/04/2021    TSH 1.41 04/01/2021    INR 1.83 (H) 04/04/2021    LABMICR Not Indicated 11/24/2014     Lab Results   Component Value Date    BILIDIR 0.70 (H) 01/31/2014    BILIDIR 1.30 (H) 01/10/2014    BILIDIR 1.50 (H) 01/09/2014     Lab Results   Component Value Date    PTH 90.2 (H) 11/24/2014    PHOS 2.6 11/24/2014     Lab Results   Component Value Date    LABALBU 2.5 04/04/2021    ALKPHOS 259 04/04/2021     04/04/2021    [facility]   Phone [phone]   Fax [phone]

## 2021-04-04 NOTE — PROGRESS NOTES
Hospitalist Progress Note      PCP: Marya Navarrete MD    Date of Admission: 3/31/2021    Chief Complaint: Fall, dyspnea    Hospital Course: This is a 80-year-old male with a past medical history with severe LV systolic dysfunction, severe mitral insufficiency, chronic A. fib on Coumadin followed by cardiologist Dr. Trixie Freeman at [address] [place] [address] since 2017 admitted after a fall.       Subjective: Patient is on high flow oxygen denies any chest pain no shortness of breath no nausea vomiting complains of back pain and feeling tired. Medications:  Reviewed    Infusion Medications    sodium chloride      furosemide (LASIX) 1mg/ml infusion 5 mg/hr (04/04/21 0239)    dilTIAZem (CARDIZEM) 125 mg in dextrose 5% 125 mL infusion Stopped (04/02/21 1236)     Scheduled Medications    warfarin  5 mg Oral Daily    amiodarone  200 mg Oral Daily    atorvastatin  20 mg Oral Daily    methIMAzole  10 mg Oral TID    calcitRIOL  0.25 mcg Oral Daily    psyllium  1 packet Oral Daily    cefTRIAXone (ROCEPHIN) IV  1,000 mg Intravenous Q24H    azithromycin  500 mg Intravenous Q24H    warfarin (COUMADIN) daily dosing (placeholder)   Other RX Placeholder     PRN Meds: sodium chloride flush, sodium chloride, promethazine **OR** ondansetron, acetaminophen **OR** acetaminophen, melatonin, perflutren lipid microspheres      Intake/Output Summary (Last 24 hours) at 4/4/2021 0921  Last data filed at 4/4/2021 0447  Gross per 24 hour   Intake 890 ml   Output 1775 ml   Net -885 ml       Physical Exam Performed:    BP (!) 92/57   Pulse 89   Temp 97.9 °F (36.6 °C) (Axillary)   Resp 20   Ht 5' 8\" (1.727 m)   Wt 167 lb 11.2 oz (76.1 kg)   SpO2 90%   BMI 25.50 kg/m²     General appearance: No apparent distress, appears stated age and cooperative. HEENT: Pupils equal, round, and reactive to light. Conjunctivae/corneas clear. Neck: Supple, with full range of motion. No jugular venous distention. Trachea midline.   Respiratory:  Normal respiratory effort. Clear to auscultation, bilaterally without Rales/Wheezes/Rhonchi. Cardiovascular: Regular rate and rhythm with normal S1/S2 without murmurs, rubs or gallops. Abdomen: Soft, non-tender, non-distended with normal bowel sounds. Musculoskeletal: No clubbing, cyanosis or edema bilaterally. Full range of motion without deformity. Skin: Skin color, texture, turgor normal.  No rashes or lesions. Neurologic:  Neurovascularly intact without any focal sensory/motor deficits. Cranial nerves: II-XII intact, grossly non-focal.  Psychiatric: Alert and oriented, thought content appropriate, normal insight  Capillary Refill: Brisk,< 3 seconds   Peripheral Pulses: +2 palpable, equal bilaterally       Labs:   Recent Labs     04/02/21 0458 04/03/21 0519 04/04/21  0511   WBC 14.4* 13.8* 13.5*   HGB 14.7 15.0 14.4   HCT 44.6 44.9 42.9    197 198     Recent Labs     04/02/21 0458 04/03/21  0519 04/04/21  0511    143 142   K 5.0 4.4 4.0    108 108   CO2 18* 21 20*   BUN 74* 97* 110*   CREATININE 3.0* 3.6* 3.4*   CALCIUM 8.6 8.5 8.2*     Recent Labs     04/02/21 0458 04/03/21  0519 04/04/21  0511   * 147* 179*   ALT 99* 117* 130*   BILITOT 1.8* 1.4* 1.7*   ALKPHOS 206* 221* 259*     Recent Labs     04/02/21 0458 04/03/21  0519 04/04/21  0511   INR 6.23* 8.29* 1.83*     Recent Labs     04/01/21  1412 04/01/21  2034   TROPONINI 0.04* 0.04*       Urinalysis:      Lab Results   Component Value Date    NITRU Negative 11/24/2014    WBCUA 0-2 11/24/2014    BACTERIA 1+ 11/24/2014    RBCUA None seen 11/24/2014    BLOODU Negative 11/24/2014    SPECGRAV 1.020 11/24/2014    GLUCOSEU Negative 11/24/2014    GLUCOSEU NEGATIVE 09/02/2011       Radiology:  XR CHEST PORTABLE   Final Result   No significant change in multifocal airspace opacities. XR CHEST PORTABLE   Final Result   Worsening multifocal airspace opacities and new pleural effusions.          CT CHEST ABDOMEN PELVIS WO CONTRAST Final Result   1. No evidence of acute traumatic injury in the chest, abdomen or pelvis. 2. Extensive bilateral ground-glass opacities in both lungs. Imaging   features can be seen with COVID-19 pneumonia, though are nonspecific and can   occur with a variety of infectious and noninfectious processes. PneInd   3. Cardiomegaly with coronary artery disease. 4. Small bilateral pleural effusions. 5. Multinodular thyroid goiter with asymmetric enlargement of the left   thyroid and rightward shift of the trachea. Thyroid nodules measure up to   5.1 cm. Please see follow-up recommendations below. RECOMMENDATIONS:   5.1 cm incidental thyroid nodule with heterogeneous and enlarged thyroid. Recommend thyroid US if clinically warranted given patient age. Reference: J Am Renetta Radiol. 2015 Feb;12(2): 143-50         XR CHEST PORTABLE   Final Result   1. Right upper lobe airspace disease has the appearance of pneumonia, with   atelectasis or pneumonia in the right base and left upper lobe   2. Cardiomegaly with pulmonary vascular congestion but no definite findings   of pulmonary edema   3. Large thyroid goiter         MRI ABDOMEN W WO CONTRAST MRCP    (Results Pending)           Assessment/Plan:    Active Hospital Problems    Diagnosis    Elevated brain natriuretic peptide (BNP) level [R79.89]    Elevated troponin [R77.8]    Non-ischemic cardiomyopathy (HCC) [I42.8]    Hyperbilirubinemia [E80.6]    Acute respiratory failure with hypoxia (HCC) [J96.01]    Chronic anticoagulation [Z79.01]    Supratherapeutic INR [R79.1]    Hyperthyroidism [E05.90]    LBBB (left bundle branch block) [I44.7]    KIERSTEN (acute kidney injury) ([address] [N17.9]    Chronic atrial fibrillation (HCC) [I48.20]    Severe mitral regurgitation [I34.0]    Chronic systolic (congestive) heart failure (HCC) [I50.22]    Acute on chronic systolic heart failure (HCC) [I50.23]    Stage 3b chronic kidney disease [N18.32]     1.   Admitted with acute on chronic systolic CHF, severe MR, chronic A. fib on anticoagulation, supratherapeutic INR. Continue with the Lasix, Entresto on hold due to hypotension continue with amiodarone and statin. Supratherapeutic INR currently Coumadin on hold we will give IV vitamin K 5 mg x1 on 4/3/21 as patient unable to take p.o. was not on BiPAP. Today INR 1.83  See outside echo report above.  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  Patient has previously declined any invasive interventions. Cardiology consult appreciated. 2.  Acute respiratory failure possible pneumonia pulmonary consulted. Pulmonary consult appreciated patient unable to tolerate BiPAP currently on high flow oxygen with O2 sat in 80s, on antibiotic Covid PCR negative. Continue with the BiPAP. 3.  Hypertension currently beta-blocker, Entresto on hold. 4.  Acute kidney injury on chronic kidney disease stage IIIb Lasix infusion Entresto on hold nephrology consulted. 5.  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 6.  Abnormal liver function tests status post cholecystectomy for acute cholecystitis 2014, will follow. Possibly due to passive congestion, consulted GI. Plan for MRI of the abdomen but unable to do as patient is on a high flow oxygen. Today discussed with the son at the bedside waiting for his sister who is the POA also a nurse will be here tomorrow and most likely they will proceed with hospice care inpatient. DVT Prophylaxis: Coumadin currently on hold due to supratherapeutic INR on admission. Diet: Dietary Nutrition Supplements: Standard High Calorie Oral Supplement  DIET NO SALT ADDED (3-4 GM);   Code Status: Limited    PT/OT Eval Status:     Dispo -possible inpatient hospice tomorrow once all siblings arrived    Jackie Bain MD

## 2021-04-04 NOTE — PROGRESS NOTES
Neri Kohler notified that FYI-pt's BP has been consistently in the 80's /50's with a HR of mid 90's to low 100. pt's sepsis screening did fire. pt's MEWS score is a 3 T. 97.6ax hr 93, RR 20 BP 80/52, Spo2 89 on Vapotherm  pt is a Lasix gtt at 5mL/hr . Is there any interventions you would like to do at this time?  Lynette Cadet RN

## 2021-04-04 NOTE — PROGRESS NOTES
[facility]     Cardiology                                     Progress Note    Admission date:  3/31/2021    Reason for follow up visit: CHF, afib    HPI/CC: Martin Cedillo was admitted on 3/31/2021 with a fall. Has been treated for A/CKD, CHF, and resp failure. IV Cardizem was stopped due to hypotension. Rhythm has been afib with an average HR of 85. Subjective: He complains of back pain. Feels SOB is improving. Denies chest pain. Vitals:  Blood pressure (!) 92/57, pulse 89, temperature 97.9 °F (36.6 °C), temperature source Axillary, resp. rate 20, height 5' 8\" (1.727 m), weight 167 lb 11.2 oz (76.1 kg), SpO2 90 %.   Temp  Av.7 °F (36.5 °C)  Min: 97.5 °F (36.4 °C)  Max: 97.9 °F (36.6 °C)  Pulse  Av  Min: 89  Max: 106  BP  Min: 80/57  Max: 104/67  SpO2  Av.3 %  Min: 81 %  Max: 95 %  FiO2   Av.7 %  Min: 70 %  Max: 100 %    24 hour I/O    Intake/Output Summary (Last 24 hours) at 2021 1005  Last data filed at 2021 0447  Gross per 24 hour   Intake 890 ml   Output 1775 ml   Net -885 ml     Current Facility-Administered Medications   Medication Dose Route Frequency Provider Last Rate Last Admin    warfarin (COUMADIN) tablet 5 mg  5 mg Oral Daily Anup Tamayo MD        amiodarone (CORDARONE) tablet 200 mg  200 mg Oral Daily Bean Montes MD   200 mg at 21 3026    atorvastatin (LIPITOR) tablet 20 mg  20 mg Oral Daily Bean Montes MD   20 mg at 21 6460    methIMAzole (TAPAZOLE) tablet 10 mg  10 mg Oral TID Bean Montes MD   10 mg at 21 0912    calcitRIOL (ROCALTROL) capsule 0.25 mcg  0.25 mcg Oral Daily Bean Montes MD   0.25 mcg at 21 0837    psyllium (METAMUCIL) 58.12 % packet 1 packet  1 packet Oral Daily Bean Montes MD   1 packet at 21 0837    sodium chloride flush 0.9 % injection 10 mL  10 mL Intravenous PRN Bean Montes MD        0.9 % sodium chloride infusion  25 mL Intravenous PRN [place] Lestine Duverney, MD        promethazine [facility]) tablet 12.5 mg  12.5 mg Oral Q6H PRN Maxi Mustafa MD        Or    ondansetron [facility]) injection 4 mg  4 mg Intravenous Q6H PRN Maxi Mustafa MD        acetaminophen (TYLENOL) tablet 650 mg  650 mg Oral Q6H PRN Maxi Mustafa MD        Or    acetaminophen (TYLENOL) suppository 650 mg  650 mg Rectal Q6H PRN Maxi Mustafa MD        melatonin tablet 3 mg  3 mg Oral Nightly PRN Maxi Mustafa MD        cefTRIAXone (ROCEPHIN) 1000 mg IVPB in 50 mL D5W minibag  1,000 mg Intravenous Q24H Maxi Mustafa MD   Stopped at 04/04/21 0914    azithromycin (ZITHROMAX) 500 mg in D5W 250ml addavial  500 mg Intravenous Q24H Maxi Mustafa  mL/hr at 04/04/21 0912 500 mg at 04/04/21 0912    warfarin (COUMADIN) daily dosing (placeholder)   Other RX Placeholder Candelaria Ball MD        furosemide (LASIX) 100 mg in dextrose 5 % 100 mL infusion  5 mg/hr Intravenous Continuous Sparkle S Puthoff, APRN - CNP 5 mL/hr at 04/04/21 0239 5 mg/hr at 04/04/21 0239    dilTIAZem 125 mg in dextrose 5 % 125 mL infusion  5-15 mg/hr Intravenous Continuous Simin Raile, APRN - CNP   Stopped at 04/02/21 1236    perflutren lipid microspheres (DEFINITY) injection 1.65 mg  1.5 mL Intravenous ONCE PRN Mirna Milton MD           Objective:     Telemetry monitor: afib    Physical Exam:  Constitutional and general appearance:  ill appearing, NAD, appears stated age and pale  HEENT: PERRL, no cervical lymphadenopathy. No masses palpable.  Normal oral mucosa  Respiratory:  · Normal excursion and expansion with use of accessory muscles  · Resp auscultation: Diminished breath sounds without wheezing and rhonchi; + bibasilar rales   Cardiovascular:  · The apical impulse is not displaced  · Heart tones are crisp and normal. irregular S1 and S2.  · Jugular venous pulsation elevated to neck  · The carotid upstroke is normal in amplitude and contour without delay or Lab Results   Component Value Date    CHOL 142 07/16/2014    HDL 39 07/16/2014    TRIG 162 07/16/2014     Cardiac Enzymes:  CK/MbTroponin  Lab Results   Component Value Date    CKTOTAL 188 03/31/2021    TROPONINI 0.04 04/01/2021     PT/ INR   Lab Results   Component Value Date    INR 1.83 04/04/2021    INR 8.29 04/03/2021    INR 6.23 04/02/2021    PROTIME 21.3 04/04/2021    PROTIME 98.2 04/03/2021    PROTIME 73.6 04/02/2021     PTT No results found for: PTT   Lab Results   Component Value Date    MG 2.90 04/04/2021      Lab Results   Component Value Date    TSH 1.41 04/01/2021       Assessment:  Acute on chronic systolic CHF/volume overload: ongoing   -adm weight 180 lbs (167 today but appears inaccurate)   -net -3.4L this admission   Cardiomyopathy: etiology unknown   -per outside records, patient declined further testing  Permanent atrial fibrillation: stable   -HR controlled   -on amiodarone prior to admission for rate control per primary cardiologist    -[id number] score over 2  LBBB: stable   HTN: hypotensive this admission  Mitral regurgitation: mod-severe on echo 11/2020  Acute respiratory failure   Acute on chronic renal failure: worsening   Abnormal liver function: worsening   Hyperthyroidism: on methimazole   Macrocytosis   Supratherapeutic INR: improved, received Vit K 4/3/2021     Plan:   1. Restart home statin when liver function improves   2. Amiodarone stopped due to hyperthyroidism and liver failure   3. Can restart IV Cardizem if needed for HR sustaining over 110  4. Restart Coumadin when INR stable/taking po   5. Lasix gtt per nephrology   6. Vitamin K today per primary   7. Limited echo was ordered 4/1/2021   8. No ACE/ARB with renal failure/hypotension   9. No beta blocker due to hypotension  10. Antibiotics per primary   11. Palliative care evaluation pending     Patient is typically followed by Dr. Hilary Vann.     Gaspar Carrel, APRN-CNP  [facility]  [phone]

## 2021-04-04 NOTE — PROGRESS NOTES
B/P 87/57  O2 sats around 85% maxed out on vapatherm. Awake, alert, and oriented. Notified Dr. Cara Riggs.

## 2021-04-05 PROBLEM — E87.20 NORMAL ANION GAP METABOLIC ACIDOSIS: Status: ACTIVE | Noted: 2021-04-05

## 2021-04-05 PROBLEM — D69.6 THROMBOCYTOPENIA (HCC): Status: ACTIVE | Noted: 2021-04-05

## 2021-04-05 LAB
A/G RATIO: 0.8 (ref 1.1–2.2)
ALBUMIN SERPL-MCNC: 2.4 G/DL (ref 3.4–5)
ALP BLD-CCNC: 279 U/L (ref 40–129)
ALT SERPL-CCNC: 112 U/L (ref 10–40)
ANION GAP SERPL CALCULATED.3IONS-SCNC: 13 MMOL/L (ref 3–16)
ANTI-NUCLEAR ANTIBODY (ANA): NEGATIVE
AST SERPL-CCNC: 124 U/L (ref 15–37)
BASOPHILS ABSOLUTE: 0 K/UL (ref 0–0.2)
BASOPHILS RELATIVE PERCENT: 0 %
BILIRUB SERPL-MCNC: 1.5 MG/DL (ref 0–1)
BUN BLDV-MCNC: 119 MG/DL (ref 7–20)
CALCIUM SERPL-MCNC: 8.1 MG/DL (ref 8.3–10.6)
CHLORIDE BLD-SCNC: 103 MMOL/L (ref 99–110)
CO2: 20 MMOL/L (ref 21–32)
CREAT SERPL-MCNC: 3.4 MG/DL (ref 0.8–1.3)
EOSINOPHILS ABSOLUTE: 0 K/UL (ref 0–0.6)
EOSINOPHILS RELATIVE PERCENT: 0.2 %
GFR AFRICAN AMERICAN: 21
GFR NON-AFRICAN AMERICAN: 17
GLOBULIN: 2.9 G/DL
GLUCOSE BLD-MCNC: 112 MG/DL (ref 70–99)
HAV IGM SER IA-ACNC: NORMAL
HBV SURFACE AB TITR SER: <3.5 MIU/ML
HCT VFR BLD CALC: 43.7 % (ref 40.5–52.5)
HEMOGLOBIN: 14.8 G/DL (ref 13.5–17.5)
HEPATITIS B SURFACE ANTIGEN INTERPRETATION: NORMAL
HEPATITIS C ANTIBODY INTERPRETATION: NORMAL
INR BLD: 1.94 (ref 0.86–1.14)
LYMPHOCYTES ABSOLUTE: 0.7 K/UL (ref 1–5.1)
LYMPHOCYTES RELATIVE PERCENT: 6 %
MAGNESIUM: 2.9 MG/DL (ref 1.8–2.4)
MCH RBC QN AUTO: 35.4 PG (ref 26–34)
MCHC RBC AUTO-ENTMCNC: 33.9 G/DL (ref 31–36)
MCV RBC AUTO: 104.6 FL (ref 80–100)
MONOCYTES ABSOLUTE: 0.7 K/UL (ref 0–1.3)
MONOCYTES RELATIVE PERCENT: 5.5 %
NEUTROPHILS ABSOLUTE: 10.8 K/UL (ref 1.7–7.7)
NEUTROPHILS RELATIVE PERCENT: 88.3 %
PDW BLD-RTO: 13.6 % (ref 12.4–15.4)
PLATELET # BLD: 126 K/UL (ref 135–450)
PMV BLD AUTO: 9.2 FL (ref 5–10.5)
POTASSIUM SERPL-SCNC: 3.8 MMOL/L (ref 3.5–5.1)
PROTHROMBIN TIME: 22.6 SEC (ref 10–13.2)
RBC # BLD: 4.17 M/UL (ref 4.2–5.9)
SODIUM BLD-SCNC: 136 MMOL/L (ref 136–145)
TOTAL PROTEIN: 5.3 G/DL (ref 6.4–8.2)
TSH REFLEX FT4: 0.68 UIU/ML (ref 0.27–4.2)
WBC # BLD: 12.2 K/UL (ref 4–11)

## 2021-04-05 PROCEDURE — 6360000002 HC RX W HCPCS: Performed by: NURSE PRACTITIONER

## 2021-04-05 PROCEDURE — 2580000003 HC RX 258: Performed by: INTERNAL MEDICINE

## 2021-04-05 PROCEDURE — 2700000000 HC OXYGEN THERAPY PER DAY

## 2021-04-05 PROCEDURE — 6360000002 HC RX W HCPCS: Performed by: INTERNAL MEDICINE

## 2021-04-05 PROCEDURE — 85610 PROTHROMBIN TIME: CPT

## 2021-04-05 PROCEDURE — 36415 COLL VENOUS BLD VENIPUNCTURE: CPT

## 2021-04-05 PROCEDURE — 2580000003 HC RX 258: Performed by: NURSE PRACTITIONER

## 2021-04-05 PROCEDURE — 80053 COMPREHEN METABOLIC PANEL: CPT

## 2021-04-05 PROCEDURE — 6370000000 HC RX 637 (ALT 250 FOR IP): Performed by: INTERNAL MEDICINE

## 2021-04-05 PROCEDURE — 2060000000 HC ICU INTERMEDIATE R&B

## 2021-04-05 PROCEDURE — 99232 SBSQ HOSP IP/OBS MODERATE 35: CPT | Performed by: INTERNAL MEDICINE

## 2021-04-05 PROCEDURE — 83735 ASSAY OF MAGNESIUM: CPT

## 2021-04-05 PROCEDURE — 99233 SBSQ HOSP IP/OBS HIGH 50: CPT | Performed by: NURSE PRACTITIONER

## 2021-04-05 PROCEDURE — 85025 COMPLETE CBC W/AUTO DIFF WBC: CPT

## 2021-04-05 PROCEDURE — 99231 SBSQ HOSP IP/OBS SF/LOW 25: CPT | Performed by: INTERNAL MEDICINE

## 2021-04-05 PROCEDURE — 94761 N-INVAS EAR/PLS OXIMETRY MLT: CPT

## 2021-04-05 RX ORDER — WARFARIN SODIUM 2.5 MG/1
2.5 TABLET ORAL
Status: ACTIVE | OUTPATIENT
Start: 2021-04-05 | End: 2021-04-06

## 2021-04-05 RX ORDER — LORAZEPAM 2 MG/ML
1 CONCENTRATE ORAL
Status: DISCONTINUED | OUTPATIENT
Start: 2021-04-05 | End: 2021-04-06 | Stop reason: HOSPADM

## 2021-04-05 RX ORDER — MORPHINE SULFATE 20 MG/ML
5 SOLUTION ORAL
Status: DISCONTINUED | OUTPATIENT
Start: 2021-04-05 | End: 2021-04-06 | Stop reason: HOSPADM

## 2021-04-05 RX ADMIN — Medication 1 MG: at 19:28

## 2021-04-05 RX ADMIN — Medication 1 MG: at 22:18

## 2021-04-05 RX ADMIN — PSYLLIUM HUSK 1 PACKET: 3.4 POWDER ORAL at 10:54

## 2021-04-05 RX ADMIN — AZITHROMYCIN MONOHYDRATE 500 MG: 500 INJECTION, POWDER, LYOPHILIZED, FOR SOLUTION INTRAVENOUS at 08:51

## 2021-04-05 RX ADMIN — Medication 5 MG: at 16:33

## 2021-04-05 RX ADMIN — Medication 5 MG: at 20:42

## 2021-04-05 RX ADMIN — METHIMAZOLE 10 MG: 5 TABLET ORAL at 10:54

## 2021-04-05 RX ADMIN — FUROSEMIDE 5 MG/HR: 10 INJECTION, SOLUTION INTRAMUSCULAR; INTRAVENOUS at 04:49

## 2021-04-05 RX ADMIN — Medication 1 MG: at 17:11

## 2021-04-05 RX ADMIN — CALCITRIOL 0.25 MCG: 0.25 CAPSULE ORAL at 10:54

## 2021-04-05 RX ADMIN — ATORVASTATIN CALCIUM 20 MG: 10 TABLET, FILM COATED ORAL at 10:54

## 2021-04-05 RX ADMIN — ACETAMINOPHEN 650 MG: 325 TABLET ORAL at 11:06

## 2021-04-05 RX ADMIN — CEFTRIAXONE SODIUM 1000 MG: 1 INJECTION, POWDER, FOR SOLUTION INTRAMUSCULAR; INTRAVENOUS at 08:50

## 2021-04-05 RX ADMIN — Medication 5 MG: at 18:32

## 2021-04-05 ASSESSMENT — ENCOUNTER SYMPTOMS: SHORTNESS OF BREATH: 1

## 2021-04-05 ASSESSMENT — PAIN SCALES - GENERAL
PAINLEVEL_OUTOF10: 3
PAINLEVEL_OUTOF10: 7
PAINLEVEL_OUTOF10: 0
PAINLEVEL_OUTOF10: 0
PAINLEVEL_OUTOF10: 7

## 2021-04-05 NOTE — PLAN OF CARE
Nutrition Problem #1: Inadequate oral intake  Intervention: Food and/or Nutrient Delivery: (food as desired)  Nutritional Goals: Consume 50% or greater of 3 meals per day and ONS

## 2021-04-05 NOTE — PROGRESS NOTES
Respiratory at bedside and placed patient on high flow nasal cannula 15L per family and patient request. Vapotherm removed. Morphine given PO. Patient alert at this time. Will continue to monitor.     Vitals:    04/05/21 1635   BP:    Pulse: 90   Resp: 22   Temp:    SpO2: 93%

## 2021-04-05 NOTE — CONSULTS
Palliative Care Initial Note  Palliative Care Admit date:  4/5/21  Reason for c/s:  GOC, Code status, Family support    Advance Directives:  Reviewed the HCPOA pt executed and he had designated his spouse as his [de-identified]. However, she has predeceased pt and the first alternate named is pts dtr, Eliecer Greer. Pt currently has a 'limited' code status to reflect DNR/DNI    Plan of care/goals:  Met w/ Soraya Green @ [facility]. In talking w/ pt, he waved to writer but was not able to share his understanding of his heart or other medical issues; he could not say that he was in the hospital. The one thing pt did express is feeling as thought his breathing is labored. Writer attempted to have discussion w/ pt around the option for comfort care but all he could verb is \"I just don't know. \"    Long d/w dtr wherein we discussed the potential that pt either cannot or doesn't wish to make his own decision. In light of Keesha's repeated wish that \"the family wants palliative or hospice care for my dad,\" we discussed, when we shift the focus of care, we would initiate comfort medications to keep pt from feeling breathless and plan to remove vapotherm for a lesser level of O2. If pt doesn't demonstrate progressive decline toward death, we would then involve hospice for the [place] [id number]. Soraya Pinaallum is very much in agreement and will contact her brother to inquire if he'd like to be here once we initiate comfort. Have d/w shift RN, awaiting response from hospitalist    Social/Spiritual:  dtr shared that, PTA, pt lived independently. Plan: family ready to shift the focus of care to comfort.         Reason for consult:  ___ Advance Care Planning  _X_ Transition of Care Planning  _X_ Psychosocial/Spiritual Support  ___ Symptom Management                                                                                                                        Rachel Santo RN

## 2021-04-05 NOTE — PROGRESS NOTES
04/05/21 0415   Oxygen Therapy/Pulse Ox   O2 Therapy Oxygen humidified   O2 Device Heated high flow cannula   O2 Flow Rate (L/min) 40 L/min   FiO2  100 %   SpO2 95 %   Pulse Oximeter Device Mode Continuous

## 2021-04-05 NOTE — CARE COORDINATION
Chart review completed for LOS day # 5     GI, Nephrology and Cardiology following. Patient remains on vapotherm. Per GI note family considering hospice. Palliative Care Consult has been placed and Rachel Santo RN palliative nurse aware and will see patient. Will awaiting outcome of palliative care talks for goals of care.

## 2021-04-05 NOTE — FLOWSHEET NOTE
04/05/21 1600   Encounter Summary   Services provided to: Family; Patient   Referral/Consult From: Palliative Care   Support System Children   Place of [facility] Completed  (Son called [employer] Saturday, no word back from Fr. Stoll)   Continue Visiting   (4/5: prayer for strength, prayer blanket)   Complexity of Encounter Moderate   Length of Encounter 30 minutes   Spiritual Assessment Completed Yes   Spiritual/Scientology   Type Spiritual support   Assessment Approachable   Intervention Prayer; Active listening;Explored feelings, thoughts, concerns;Explored coping resources; Discussed belief system/Congregational practices/christine   Outcome Expressed gratitude

## 2021-04-05 NOTE — PROGRESS NOTES
Family wishes to pursue comfort care after meeting with [facility]. Son of patient does not wish to come in at this time. Orders in place from Dr. Archana Ngo for Morphine and Ativan PO. Patient to be removed from vapotherm and placed on 15L non-re breather for comfort and wean as tolerated. Family at bedside and wishes to continue with plan.

## 2021-04-05 NOTE — PROGRESS NOTES
Comprehensive Nutrition Assessment    Type and Reason for Visit:  Reassess    Nutrition Recommendations/Plan:   1. Food as desired by patient, comfort measures     Nutrition Assessment:  Follow up:  No added salt diet with Ensure tid. Nutritional intake decreased. Continues on Vapotherm. Palliative care consulted, family leaning towards comfort measures. Malnutrition Assessment:  Malnutrition Status:  Insufficient data(WIll continue to monitor and assess as more information is available.)    Context:  Acute Illness       Estimated Daily Nutrient Needs:  Energy (kcal):  8268-2716 kcals/day; Weight Used for Energy Requirements:  Ideal(25-30 kcal/kg)     Protein (g):  70-84 g/day; Weight Used for Protein Requirements:  Ideal(1-1.2 g/kg)        Fluid (ml/day):  2000 ml; Method Used for Fluid Requirements:  1 ml/kcal      Nutrition Related Findings:  No BM recorded. Edema RLE +3, LLE +4. . Wounds:  None(Bruising, scattered.)       Current Nutrition Therapies:    Dietary Nutrition Supplements: Standard High Calorie Oral Supplement  DIET NO SALT ADDED (3-4 GM); Anthropometric Measures:  · Height: 5' 8\" (172.7 cm)  · Current Body Weight: 168 lb 3 oz (76.3 kg)   · Admission Body Weight: 180 lb 9 oz (81.9 kg)    · Ideal Body Weight: 154 lbs; % Ideal Body Weight 116.9 %   · BMI: 25.6  · Adjusted Body Weight:  ; No Adjustment   · BMI Categories: Overweight (BMI 25.0-29. 9)       Nutrition Diagnosis:   · Inadequate oral intake related to early satiety as evidenced by intake 0-25%    Nutrition Interventions:   Food and/or Nutrient Delivery:  (food as desired)  Nutrition Education/Counseling:  No recommendation at this time   Coordination of Nutrition Care:  Continue to monitor while inpatient    Goals:  Consume 50% or greater of 3 meals per day and ONS       Nutrition Monitoring and Evaluation:   Behavioral-Environmental Outcomes:  None Identified   Food/Nutrient Intake Outcomes:  Food and Nutrient Intake,

## 2021-04-05 NOTE — PROGRESS NOTES
04/05/21 1137   Oxygen Therapy/Pulse Ox   O2 Device Heated high flow cannula   O2 Flow Rate (L/min) 40 L/min   FiO2  100 %   SpO2 92 %

## 2021-04-05 NOTE — PROGRESS NOTES
Pharmacy Note  Warfarin Consult  Dx: AFib  [id number] Score for Atrial Fibrillation Stroke Risk 3  Goal INR range 2-3   Home Warfarin dose: 2.5 mg daily except 5mg twice a week     Date                 INR                 Warfarin  4/1                   4.21                 Hold  4/2                    6.23                Hold  4/3                   8.29                 Hold  (Vit K 5 mg)  4/4                   1.83                 5 mg  4/5  1.94  2.5mg    Recommend warfarin 2.5 mg x 1 dose. Vitamin K 5 mg given on 4/3. Daily INR ordered. Rx will continue to manage therapy per consult order.   Lorena Tinsley PharmD 4/5/2021 8:30 AM

## 2021-04-05 NOTE — PROGRESS NOTES
[address]  Cardiology  Progress Note    Admission date:  3/31/2021    Reason for follow up visit: CHF, cardiomyopathy    HPI/CC: Leticia Diaz is a 80 y.o. male who was admitted 3/31/2021 for fall and shortness of breath. Echo showed EF 20-25%, mitral regurgitation, +WMAs. Treated for CHF, KIERSTEN, AF, respiratory failure, possible hepatopathy. Rhythm has been AF 70s. Subjective: Main complaint is mouth soreness and phlegm. Does not think shortness of breath has changed, no chest pain. Vitals:  Blood pressure (!) 93/57, pulse 88, temperature 97.6 °F (36.4 °C), temperature source Axillary, resp. rate 20, height 5' 8\" (1.727 m), weight 168 lb 3.2 oz (76.3 kg), SpO2 94 %.   Temp  Av °F (36.7 °C)  Min: 97.6 °F (36.4 °C)  Max: 98.7 °F (37.1 °C)  Pulse  Av  Min: 87  Max: 99  BP  Min: 80/50  Max: 95/58  SpO2  Av.4 %  Min: 85 %  Max: 95 %  FiO2   Av %  Min: 100 %  Max: 100 %    24 hour I/O    Intake/Output Summary (Last 24 hours) at 2021 1056  Last data filed at 2021 1016  Gross per 24 hour   Intake 600 ml   Output 950 ml   Net -350 ml     Current Facility-Administered Medications   Medication Dose Route Frequency Provider Last Rate Last Admin    warfarin (COUMADIN) tablet 2.5 mg  2.5 mg Oral Once Domenico Patino MD        atorvastatin (LIPITOR) tablet 20 mg  20 mg Oral Daily Delmi Grace MD   20 mg at 21 1054    methIMAzole (TAPAZOLE) tablet 10 mg  10 mg Oral TID Delmi Grace MD   10 mg at 21 1054    calcitRIOL (ROCALTROL) capsule 0.25 mcg  0.25 mcg Oral Daily Delmi Grace MD   0.25 mcg at 21 1054    psyllium (METAMUCIL) 58.12 % packet 1 packet  1 packet Oral Daily Dlemi Grace MD   1 packet at 21 1054    sodium chloride flush 0.9 % injection 10 mL  10 mL Intravenous PRN Delmi Grace MD        0.9 % sodium chloride infusion  25 mL Intravenous PRN Delmi Grace MD        promethazine (PHENERGAN) tablet 12.5 mg  12.5 mg Oral Q6H PRN Delmi Grace MD        Or    ondansetron [facility]) injection 4 mg  4 mg Intravenous Q6H PRN Delmi Grace MD        acetaminophen (TYLENOL) tablet 650 mg  650 mg Oral Q6H PRN Delmi Grace MD        Or    acetaminophen (TYLENOL) suppository 650 mg  650 mg Rectal Q6H PRN Delmi Grace MD        melatonin tablet 3 mg  3 mg Oral Nightly PRN Delmi Grace MD        cefTRIAXone (ROCEPHIN) 1000 mg IVPB in 50 mL D5W minibag  1,000 mg Intravenous Q24H Delmi Grace MD   Stopped at 04/05/21 1016    azithromycin (ZITHROMAX) 500 mg in D5W 250ml addavial  500 mg Intravenous Q24H Delmi Grace MD   Stopped at 04/05/21 1022    warfarin (COUMADIN) daily dosing (placeholder)   Other RX Placeholder Domingo Oseguera MD        furosemide (LASIX) 100 mg in dextrose 5 % 100 mL infusion  5 mg/hr Intravenous Continuous NHI Man - CNP 5 mL/hr at 04/05/21 0449 5 mg/hr at 04/05/21 0449    dilTIAZem 125 mg in dextrose 5 % 125 mL infusion  5-15 mg/hr Intravenous Continuous NHI Macario - CNP   Stopped at 04/02/21 1236    perflutren lipid microspheres (DEFINITY) injection 1.65 mg  1.5 mL Intravenous ONCE PRN Antony Molina MD         Review of Systems   Constitutional: Positive for fatigue. Respiratory: Positive for shortness of breath. Cardiovascular: Negative for chest pain, palpitations and leg swelling. Neurological: Negative.       Objective:     Telemetry monitor: AF 70s    Physical Exam:  Constitutional:  Mildly distressed, alert, appears stated age  Eyes: PERRL, sclera nonicteric  Neck:  Supple, no masses, no thyroidmegaly, +JVD  Skin:  Warm and dry; no rash or lesions  Heart: Irregular, normal apex, S1 and S2 normal, +murmur  Lungs:  Normal respiratory effort; clear; no wheezing/rhonchi/rales  Abdomen: soft, non tender, + bowel sounds  Extremities:  No edema or cyanosis; no clubbing  Neuro: alert and oriented, moves legs and arms equally, normal mood failure  KIERSTEN  Elevated LFTs    Plan:   1. Discussed palliative care/hospice options with patient and his daughter at bedside. She seems in agreement with these options but patient appears indecisive. Reviewed cardiac status and trajectory of CHF. Overall poor prognosis due to multiple comorbidities. 2. Not on ACE/ARB/beta blocker due to hypotension  3. Diuretics per nephrology  4. Amiodarone stopped due to liver function, statin also held  5.  Ongoing palliative care discussions    Follows with Dr. Saran Odom of [address], APRN-CNP  [facility]  [phone]

## 2021-04-05 NOTE — PROGRESS NOTES
Hospitalist Progress Note      PCP: Rajan Carroll MD    Date of Admission: 3/31/2021    Chief Complaint: Fall, dyspnea    Hospital Course: This is a 27-year-old male with a past medical history with severe LV systolic dysfunction, severe mitral insufficiency, chronic A. fib on Coumadin followed by cardiologist Dr. Elfego Mendoza at [address] [place] [address] since 2017 admitted after a fall.       Subjective: Looks very tired, on Vapotherm  Very frail        Medications:  Reviewed    Infusion Medications    sodium chloride      furosemide (LASIX) 1mg/ml infusion 5 mg/hr (04/05/21 6329)    dilTIAZem (CARDIZEM) 125 mg in dextrose 5% 125 mL infusion Stopped (04/02/21 1236)     Scheduled Medications    warfarin  2.5 mg Oral Once    atorvastatin  20 mg Oral Daily    methIMAzole  10 mg Oral TID    calcitRIOL  0.25 mcg Oral Daily    psyllium  1 packet Oral Daily    cefTRIAXone (ROCEPHIN) IV  1,000 mg Intravenous Q24H    azithromycin  500 mg Intravenous Q24H    warfarin (COUMADIN) daily dosing (placeholder)   Other RX Placeholder     PRN Meds: sodium chloride flush, sodium chloride, promethazine **OR** ondansetron, acetaminophen **OR** acetaminophen, melatonin, perflutren lipid microspheres      Intake/Output Summary (Last 24 hours) at 4/5/2021 1403  Last data filed at 4/5/2021 1016  Gross per 24 hour   Intake 600 ml   Output 950 ml   Net -350 ml       Physical Exam Performed:    BP 99/71   Pulse 89   Temp 98.1 °F (36.7 °C) (Axillary)   Resp 19   Ht 5' 8\" (1.727 m)   Wt 168 lb 3.2 oz (76.3 kg)   SpO2 92%   BMI 25.57 kg/m²     General appearance: On Vapotherm, ill looking, mild distress  HEENT: Pupils equal, round, . Conjunctivae/corneas clear. Neck: Supple, . No jugular venous distention. Trachea midline. Respiratory: On Vapotherm. bilaterally with t Rales/ no Wheezes/Rhonchi. Cardiovascular: Regular rate and rhythm with normal S1/S2 without murmurs, rubs or gallops.   Abdomen: Soft, non-tender, non-distended with normal bowel sounds. Musculoskeletal: No clubbing, cyanosis or edema bilaterally. Skin: Skin color, texture, turgor normal.  No rashes or lesions. Neurologic:  Neurovascularly intact without any focal sensory/motor deficits. .  Psychiatric: Alert and oriented,x 2 -forgetful, poor comprehension t  Capillary Refill: Brisk,< 3 seconds   Peripheral Pulses: +2 palpable, equal bilaterally       Labs:   Recent Labs     04/03/21 0519 04/04/21 0511 04/05/21  0453   WBC 13.8* 13.5* 12.2*   HGB 15.0 14.4 14.8   HCT 44.9 42.9 43.7    198 126*     Recent Labs     04/03/21 0519 04/04/21  0511 04/05/21  0453    142 136   K 4.4 4.0 3.8    108 103   CO2 21 20* 20*   BUN 97* 110* 119*   CREATININE 3.6* 3.4* 3.4*   CALCIUM 8.5 8.2* 8.1*     Recent Labs     04/03/21 0519 04/04/21 0511 04/05/21  0453   * 179* 124*   * 130* 112*   BILITOT 1.4* 1.7* 1.5*   ALKPHOS 221* 259* 279*     Recent Labs     04/03/21 0519 04/04/21  0511 04/05/21  0453   INR 8.29* 1.83* 1.94*     No results for input(s): CKTOTAL, TROPONINI in the last 72 hours. Urinalysis:      Lab Results   Component Value Date    NITRU Negative 11/24/2014    WBCUA 0-2 11/24/2014    BACTERIA 1+ 11/24/2014    RBCUA None seen 11/24/2014    BLOODU Negative 11/24/2014    SPECGRAV 1.020 11/24/2014    GLUCOSEU Negative 11/24/2014    GLUCOSEU NEGATIVE 09/02/2011       Radiology:  XR CHEST PORTABLE   Final Result   No significant change in multifocal airspace opacities. XR CHEST PORTABLE   Final Result   Worsening multifocal airspace opacities and new pleural effusions. CT CHEST ABDOMEN PELVIS WO CONTRAST   Final Result   1. No evidence of acute traumatic injury in the chest, abdomen or pelvis. 2. Extensive bilateral ground-glass opacities in both lungs. Imaging   features can be seen with COVID-19 pneumonia, though are nonspecific and can   occur with a variety of infectious and noninfectious processes. PneInd   3. Cardiomegaly with coronary artery disease. 4. Small bilateral pleural effusions. 5. Multinodular thyroid goiter with asymmetric enlargement of the left   thyroid and rightward shift of the trachea. Thyroid nodules measure up to   5.1 cm. Please see follow-up recommendations below. RECOMMENDATIONS:   5.1 cm incidental thyroid nodule with heterogeneous and enlarged thyroid. Recommend thyroid US if clinically warranted given patient age. Reference: J Am Renetta Radiol. 2015 Feb;12(2): 143-50         XR CHEST PORTABLE   Final Result   1. Right upper lobe airspace disease has the appearance of pneumonia, with   atelectasis or pneumonia in the right base and left upper lobe   2. Cardiomegaly with pulmonary vascular congestion but no definite findings   of pulmonary edema   3. Large thyroid goiter                 Assessment/Plan:    Active Hospital Problems    Diagnosis    Fall [W19. XXXA]    Abnormal LFTs [R94.5]    Elevated brain natriuretic peptide (BNP) level [R79.89]    Elevated troponin [R77.8]    Non-ischemic cardiomyopathy (HCC) [I42.8]    Hyperbilirubinemia [E80.6]    Acute respiratory failure with hypoxia (HCC) [J96.01]    Chronic anticoagulation [Z79.01]    Supratherapeutic INR [R79.1]    Hyperthyroidism [E05.90]    LBBB (left bundle branch block) [I44.7]    KIERSTEN (acute kidney injury) ([facility] [N17.9]    Chronic atrial fibrillation (HCC) [I48.20]    Severe mitral regurgitation [I34.0]    Chronic systolic (congestive) heart failure (HCC) [I50.22]    Acute on chronic systolic heart failure (HCC) [I50.23]    Stage 3b chronic kidney disease [N18.32]     1. Admitted with acute on chronic systolic CHF, severe MR, chronic A. fib on anticoagulation, s/p supratherapeutic INR.   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  Patient has previously declined any invasive

## 2021-04-05 NOTE — PROGRESS NOTES
04/04/21 1958   Oxygen Therapy/Pulse Ox   O2 Flow Rate (L/min) 40 L/min   FiO2  100 %   Resp 28   SpO2 (!) 85 %

## 2021-04-05 NOTE — PROGRESS NOTES
[facility]  ==========  [address] [facility],  [facility] [address]  [place], [address] [facility] [place]  Phone: [phone] He is a  [5] White [1] 80 y.o. . male      Main Problems/Chief Complaint     Worsening liver fn. Clinical Summary    Patient has had worsening liver chemistries which appear to be related to decompensated systolic CHF with hypotension. The patient and the family are being offered hospice. But the patient has not made up his mind. He has been saying \"I do not know\". PAST MEDICAL HISTORY     Past Medical History:   Diagnosis Date    Allergic rhinitis     Atrial fibrillation ([address]     CHF (congestive heart failure) (HCC)     Chronic kidney disease, stage III (moderate)     Hyperlipidemia     Hypertension     Macular degeneration     Thyroid disease     Vitamin D deficiency      FAMILY HISTORY     Family History   Problem Relation Age of Onset    Cancer Father         bladder       SURGICAL HISTORY     Past Surgical History:   Procedure Laterality Date    CARDIOVERSION  2006    atrial fib    COLONOSCOPY  2013    EYE SURGERY  2000    archie iol    JOINT REPLACEMENT Left 2012    hip    JOINT REPLACEMENT Right 2004    hip     CURRENT MEDICATIONS   (This list may include medications prescribed during this encounter as epic can not insert only the list prior to this encounter.)      ALLERGIES   No Known Allergies      PHYSICAL EXAM   Vitals as per nursing record. Abd: soft, non-tender, no masses are felt. Neuro/psych: Alert & oriented x 3. But mental capacity to make decisions for himself seems to be compromised significantly. FINAL IMPRESSION AND RECOMMENDATIONS     I have discussed the overall management with the patient's daughter. She agrees with no endoscopic intervention is planned at this time. She favors hospice.     The total time spent face-to-face, review of the record and on the robert during this visit was 15 minutes with more than 50% of the time spent in review of the electronic record, coordination of care and the analysis and planning of the management of the GI issue(s). Sulaiman Theron 4/6/21 7:42 AM EDT    CC:  Alvarez Felipe MD      IMPORTANT: Please note that some portions of this note may have been created using Dragon voice recognition software. Some \"sound-alike\" and totally wrong word substitutions may have taken place due to known inherent limitations of any such software, including this voice recognition software. In spite of efforts to eliminate such errors, some may not have been corrected. So please read the note with this in mind and recognize such mistakes and understand the correct version using the  context. Thanks.

## 2021-04-06 VITALS
BODY MASS INDEX: 25.31 KG/M2 | TEMPERATURE: 98 F | SYSTOLIC BLOOD PRESSURE: 99 MMHG | HEART RATE: 80 BPM | OXYGEN SATURATION: 97 % | RESPIRATION RATE: 18 BRPM | WEIGHT: 167 LBS | HEIGHT: 68 IN | DIASTOLIC BLOOD PRESSURE: 71 MMHG

## 2021-04-06 LAB
HAV AB SERPL IA-ACNC: NEGATIVE
HEPATITIS B CORE TOTAL ANTIBODY: NEGATIVE

## 2021-04-06 PROCEDURE — 6370000000 HC RX 637 (ALT 250 FOR IP): Performed by: INTERNAL MEDICINE

## 2021-04-06 PROCEDURE — 99232 SBSQ HOSP IP/OBS MODERATE 35: CPT | Performed by: INTERNAL MEDICINE

## 2021-04-06 RX ADMIN — Medication 1 MG: at 12:21

## 2021-04-06 RX ADMIN — Medication 5 MG: at 06:13

## 2021-04-06 RX ADMIN — Medication 5 MG: at 15:14

## 2021-04-06 RX ADMIN — Medication 5 MG: at 08:32

## 2021-04-06 RX ADMIN — Medication 1 MG: at 15:15

## 2021-04-06 ASSESSMENT — PAIN SCALES - GENERAL
PAINLEVEL_OUTOF10: 0
PAINLEVEL_OUTOF10: 2
PAINLEVEL_OUTOF10: 0
PAINLEVEL_OUTOF10: 0

## 2021-04-06 ASSESSMENT — PAIN SCALES - WONG BAKER: WONGBAKER_NUMERICALRESPONSE: 0

## 2021-04-06 NOTE — DISCHARGE SUMMARY
Hospital Medicine Discharge Summary    Patient ID: Donelda Oas      Patient's PCP: Jackie Bronson MD    Admit Date: 3/31/2021     Discharge Date: 4/6/2021      Admitting Physician: Hoa Corona MD     Discharge Physician: Humberto Minaya MD     Discharge Diagnoses: Active Hospital Problems    Diagnosis    Thrombocytopenia ([facility] [D69.6]    Normal anion gap metabolic acidosis [A85.3]    Fall [W19. XXXA]    Abnormal LFTs [R94.5]    Elevated brain natriuretic peptide (BNP) level [R79.89]    Elevated troponin [R77.8]    Non-ischemic cardiomyopathy (HCC) [I42.8]    Hyperbilirubinemia [E80.6]    Acute respiratory failure with hypoxia (HCC) [J96.01]    Chronic anticoagulation [Z79.01]    Supratherapeutic INR [R79.1]    Hyperthyroidism [E05.90]    LBBB (left bundle branch block) [I44.7]    KIERSTEN (acute kidney injury) ([facility] [N17.9]    Chronic atrial fibrillation (HCC) [I48.20]    Severe mitral regurgitation [I34.0]    Chronic systolic (congestive) heart failure (HCC) [I50.22]    Acute on chronic systolic heart failure (HCC) [I50.23]    Stage 3b chronic kidney disease [N18.32]       The patient was seen and examined on day of discharge and this discharge summary is in conjunction with any daily progress note from day of discharge. Hospital Course:   .  Admitted with acute on chronic systolic CHF, severe MR, chronic A. fib on anticoagulation, s/p supratherapeutic INR.  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  Patient has previously declined any invasive intervention  Cardiology consult appreciated. Advised on palliative approach    2.  Acute respiratory failure possible pneumonia pulmonary consulted. -We will send and advised on palliative care    3.  Hypertension   4.  Acute kidney injury on chronic kidney disease nephrology agreed on palliative care and hospice  5.  Hyperthyroidism   6.  Abnormal liver function tests  Worsening liver function most probably secondary to shock t  .  7-low blood pressure/cardiogenic shock-secondary to severe CHF          Physical Exam Performed:     BP 99/71   Pulse 80   Temp 98 °F (36.7 °C) (Oral)   Resp 18   Ht 5' 8\" (1.727 m)   Wt 167 lb (75.8 kg)   SpO2 97%   BMI 25.39 kg/m²       General appearance: Comfortable nonresponsive     Respiratory: On oxygen   cardiovascular: S1-S2 regular     abdomen: Soft,  non-distended with normal bowel sounds. Neurologic: Response to deep pain only  l. Labs: For convenience and continuity at follow-up the following most recent labs are provided:      CBC:    Lab Results   Component Value Date    WBC 12.2 04/05/2021    HGB 14.8 04/05/2021    HCT 43.7 04/05/2021     04/05/2021       Renal:    Lab Results   Component Value Date     04/05/2021    K 3.8 04/05/2021     04/05/2021    CO2 20 04/05/2021     04/05/2021    CREATININE 3.4 04/05/2021    CALCIUM 8.1 04/05/2021    PHOS 2.6 11/24/2014         Significant Diagnostic Studies    Radiology:   XR CHEST PORTABLE   Final Result   No significant change in multifocal airspace opacities. XR CHEST PORTABLE   Final Result   Worsening multifocal airspace opacities and new pleural effusions. CT CHEST ABDOMEN PELVIS WO CONTRAST   Final Result   1. No evidence of acute traumatic injury in the chest, abdomen or pelvis. 2. Extensive bilateral ground-glass opacities in both lungs. Imaging   features can be seen with COVID-19 pneumonia, though are nonspecific and can   occur with a variety of infectious and noninfectious processes. PneInd   3. Cardiomegaly with coronary artery disease. 4. Small bilateral pleural effusions. 5. Multinodular thyroid goiter with asymmetric enlargement of the left   thyroid and rightward shift of the trachea. Thyroid nodules measure up to   5.1 cm. Please see follow-up recommendations below.       RECOMMENDATIONS:   5.1 cm incidental thyroid nodule

## 2021-04-06 NOTE — PROGRESS NOTES
Discharge instructions and medications reviewed with patients family at bedside. IV and Tele discontinued, Pt to transfer to inpatient hospice unit. Premedicated prior to transfer d/t agitation and restlessness. All belongings accounted for and given to patients daughter at bedside. Discharge packet copy sheet signed and placed in chart by MD.  Kayy Wu notified.    Alba remains in place per MD/HOC RN request. Electronically signed by Jennings Denver, RN on 4/6/2021 at 3:37 PM

## 2021-04-06 NOTE — PROGRESS NOTES
Patient found to be on 15L with all fluids/medications stopped. Family requested removal of bipap/vapotherm and for patient to be put on 15L during day shift. Palliative care on board. Labs d/c as ordered under a nursing communication. Patient resting comfortably overnight. Ativan Q2 and morphine PRN ordered. Patient stable at 15L.

## 2021-04-06 NOTE — PROGRESS NOTES
Per pharmacy, omnicells are not stocked with sublingual ativan because they are unavailable hospital wide. They are potentially going to be restocked today.

## 2021-04-06 NOTE — PLAN OF CARE
Palliative Care Progress Note  Palliative Care Admit date:  4/5/21    Advance Directives:  Spoke w/ Lilo Middleton who is the pts HCPOA (reviewed pts documents yesterday) and she will work w/ [employer] to schedule time to meet today    Plan:  HOC to meet w/ family to discuss IPU placement        Reason for consult:    ___ Advance Care Planning  _X_ Transition of Care Planning  ___ Psychosocial/Spiritual Support  ___ Symptom Management                                                                                                                        Irma Maloney RN

## 2021-04-06 NOTE — PROGRESS NOTES
[facility]    Met with patient' jose/TABBY Marinelli to discuss hospice philosophy and services. They are agreeable to hospice and plan is: [facility] set up at #:30 pm-4pm with [facility]  Recommendations:  Please have MD sign [facility] which is located in patient's soft chart  Please have MD place discharge order and complete the discharge task per hospital procedure    Updated staff nurse on plan. Notified IRMA García and Palliative Care nurse Laura. Thank you for the opportunity to serve this patient and family.

## 2021-04-06 NOTE — PROGRESS NOTES
INPATIENT PULMONARY CRITICAL CARE PROGRESS NOTE      Reason for visit: Acute respiratory failure, pulmonary edema/volume overload, CKD with KIERSTEN    SUBJECTIVE: Patient when evaluated this morning was on high flow oxygen to maintain saturation,, patient was having intermittent tachypnea, , patient had no fever, patient's blood pressure was borderline, patient was in atrial fibrillation with controlled ventricular rate on the monitor, patient has had borderline urine output overnight with cumulative fluid balance of -3.4 L, patient's glycemic control was acceptable, no other pertinent review of system of concern     Physical Exam:  Blood pressure 99/71, pulse 80, temperature 98 °F (36.7 °C), temperature source Oral, resp. rate 18, height 5' 8\" (1.727 m), weight 167 lb (75.8 kg), SpO2 97 %.'   Constitutional: Frail and elderly, on high flow oxygen , in mild respiratory distress. HENT:  Oropharynx is clear and dry. Dry mucosa, no obvious lesions   Eyes:  Conjunctivae are normal. No scleral icterus. Neck:  No JVD. Cardiovascular: s1s2 irregularly irregular , distant heart sounds. No lower extremity edema. Pulmonary/Chest: No wheezes. Few scattered rales. No accessory muscle usage or stridor. Abdominal:  Deferred. Musculoskeletal: No cyanosis. No clubbing. No obvious joint deformity. Lymphadenopathy: Deferred. Skin: Skin is warm and dry. No rash or nodules on the exposed extremities. Psychiatric: Normal mood and affect. Behavior is normal.  No anxiety. Neurologic: Awake but mildly confused, slightly garbled speech.   No obvious focal deficit, detailed exam not performed      Results:  CBC:   Recent Labs     04/04/21  0511 04/05/21  0453   WBC 13.5* 12.2*   HGB 14.4 14.8   HCT 42.9 43.7   .8* 104.6*    126*     BMP:   Recent Labs     04/04/21  0511 04/05/21  0453    136   K 4.0 3.8    103   CO2 20* 20*   * 119*   CREATININE 3.4* 3.4*     LIVER PROFILE:   Recent Labs 04/04/21  0511 04/05/21  0453   * 124*   * 112*   BILITOT 1.7* 1.5*   ALKPHOS 259* 279*     PT/INR:   Recent Labs     04/04/21  0511 04/05/21  0453   PROTIME 21.3* 22.6*   INR 1.83* 1.94*       Imaging:  I have reviewed radiology images personally. XR CHEST PORTABLE   Final Result   No significant change in multifocal airspace opacities. XR CHEST PORTABLE   Final Result   Worsening multifocal airspace opacities and new pleural effusions. CT CHEST ABDOMEN PELVIS WO CONTRAST   Final Result   1. No evidence of acute traumatic injury in the chest, abdomen or pelvis. 2. Extensive bilateral ground-glass opacities in both lungs. Imaging   features can be seen with COVID-19 pneumonia, though are nonspecific and can   occur with a variety of infectious and noninfectious processes. PneInd   3. Cardiomegaly with coronary artery disease. 4. Small bilateral pleural effusions. 5. Multinodular thyroid goiter with asymmetric enlargement of the left   thyroid and rightward shift of the trachea. Thyroid nodules measure up to   5.1 cm. Please see follow-up recommendations below. RECOMMENDATIONS:   5.1 cm incidental thyroid nodule with heterogeneous and enlarged thyroid. Recommend thyroid US if clinically warranted given patient age. Reference: J Am Renetta Radiol. 2015 Feb;12(2): 143-50         XR CHEST PORTABLE   Final Result   1. Right upper lobe airspace disease has the appearance of pneumonia, with   atelectasis or pneumonia in the right base and left upper lobe   2. Cardiomegaly with pulmonary vascular congestion but no definite findings   of pulmonary edema   3. Large thyroid goiter                ECHO-Summary   Limited only f/u for LVEF and mitral regurgitation. The left ventricular systolic function is severely reduced with an ejection   fraction of 20 - 25 %.    There is hypokinesis of the anterior, anteroseptum, apex, apical lateral,   apical septum, inferioseptum, inferior and apical anterior walls. Moderate mitral regurgitation. Assessment and plan:  Acute respiratory failure, hypoxemic. On high flow oxygen   Acute pulmonary edema. CXR probably represents bilateral edema, less likely pneumonia. On Lasix drip. He does have poor LVEF and severe MR. Infection appears less likely. CXR not improved in spite of diuresis  Renal function still on lower side  CKD/KIERSTEN. /NAG Off Lasix drip with improved urine output, but worsening BUN and creatinine. ? Bicarb supplementation   Severe MR, acute on chronic systolic CHF. Cardiology following  Atrial fibrillation on chronic anticoagulation, supratherapeutic INR. Cardiology/EP following. Amiodarone held, echocardiogram with EF 20 to 25%, moderate MR  Elevated troponin. Probably related to renal insufficiency  Leukocytosis. Unclear whether he has an infection such as pneumonia. Procalcitonin not significantly elevated  Abnormal LFT. Possibly congestive  Macrocytosis. Per IM, normal B12 and folate levels  HTN, HLD, hyperthyroidism, vitamin D deficiency. Per IM. DVT prophylaxis. INR elevated.       Case d/w nursing   Patient is overall long-term prognosis appears to be guarded  Patient is a limited code now-[facility] care being contemplated     Will sign off          Electronically signed by:  Eileen Meeks MD    4/6/2021    2:39 PM.

## 2021-04-06 NOTE — PROGRESS NOTES
Hospitalist Progress Note      PCP: Marya Navarrete MD    Date of Admission: 3/31/2021    Chief Complaint: Fall, dyspnea    Hospital Course: This is a 72-year-old male with a past medical history with severe LV systolic dysfunction, severe mitral insufficiency, chronic A. fib on Coumadin followed by cardiologist Dr. Trixie Freeman at [address] [place] [address] since 2017 admitted after a fall.       Subjective: Looks very tired, on Vapotherm  Very frail        Medications:  Reviewed    Infusion Medications    sodium chloride      furosemide (LASIX) 1mg/ml infusion Stopped (04/05/21 1551)    dilTIAZem (CARDIZEM) 125 mg in dextrose 5% 125 mL infusion Stopped (04/02/21 1236)     Scheduled Medications    LORazepam  1 mg Sublingual Q2H    atorvastatin  20 mg Oral Daily    methIMAzole  10 mg Oral TID    calcitRIOL  0.25 mcg Oral Daily    psyllium  1 packet Oral Daily    cefTRIAXone (ROCEPHIN) IV  1,000 mg Intravenous Q24H    azithromycin  500 mg Intravenous Q24H    warfarin (COUMADIN) daily dosing (placeholder)   Other RX Placeholder     PRN Meds: morphine 20MG/ML, sodium chloride flush, sodium chloride, acetaminophen **OR** acetaminophen, melatonin, perflutren lipid microspheres      Intake/Output Summary (Last 24 hours) at 4/6/2021 0952  Last data filed at 4/6/2021 9717  Gross per 24 hour   Intake 360 ml   Output 250 ml   Net 110 ml       Physical Exam Performed:    BP 99/71   Pulse 87   Temp 98 °F (36.7 °C) (Oral)   Resp 18   Ht 5' 8\" (1.727 m)   Wt 167 lb (75.8 kg)   SpO2 93%   BMI 25.39 kg/m²     General appearance: On Vapotherm, ill looking, mild distress  HEENT: Pupils equal, round, . Conjunctivae/corneas clear. Neck: Supple, . No jugular venous distention. Trachea midline. Respiratory: On Vapotherm. bilaterally with t Rales/ no Wheezes/Rhonchi. Cardiovascular: Regular rate and rhythm with normal S1/S2 without murmurs, rubs or gallops. Abdomen: Soft, non-tender, non-distended with normal bowel sounds. Musculoskeletal: No clubbing, cyanosis or edema bilaterally. Skin: Skin color, texture, turgor normal.  No rashes or lesions. Neurologic:  Neurovascularly intact without any focal sensory/motor deficits. .  Psychiatric: Alert and oriented,x 2 -forgetful, poor comprehension t  Capillary Refill: Brisk,< 3 seconds   Peripheral Pulses: +2 palpable, equal bilaterally       Labs:   Recent Labs     04/04/21  0511 04/05/21  0453   WBC 13.5* 12.2*   HGB 14.4 14.8   HCT 42.9 43.7    126*     Recent Labs     04/04/21  0511 04/05/21  0453    136   K 4.0 3.8    103   CO2 20* 20*   * 119*   CREATININE 3.4* 3.4*   CALCIUM 8.2* 8.1*     Recent Labs     04/04/21  0511 04/05/21  0453   * 124*   * 112*   BILITOT 1.7* 1.5*   ALKPHOS 259* 279*     Recent Labs     04/04/21 0511 04/05/21  0453   INR 1.83* 1.94*     No results for input(s): CKTOTAL, TROPONINI in the last 72 hours. Urinalysis:      Lab Results   Component Value Date    NITRU Negative 11/24/2014    WBCUA 0-2 11/24/2014    BACTERIA 1+ 11/24/2014    RBCUA None seen 11/24/2014    BLOODU Negative 11/24/2014    SPECGRAV 1.020 11/24/2014    GLUCOSEU Negative 11/24/2014    GLUCOSEU NEGATIVE 09/02/2011       Radiology:  XR CHEST PORTABLE   Final Result   No significant change in multifocal airspace opacities. XR CHEST PORTABLE   Final Result   Worsening multifocal airspace opacities and new pleural effusions. CT CHEST ABDOMEN PELVIS WO CONTRAST   Final Result   1. No evidence of acute traumatic injury in the chest, abdomen or pelvis. 2. Extensive bilateral ground-glass opacities in both lungs. Imaging   features can be seen with COVID-19 pneumonia, though are nonspecific and can   occur with a variety of infectious and noninfectious processes. PneInd   3. Cardiomegaly with coronary artery disease. 4. Small bilateral pleural effusions.    5. Multinodular thyroid goiter with asymmetric enlargement of the left   thyroid and rightward shift of the trachea. Thyroid nodules measure up to   5.1 cm. Please see follow-up recommendations below. RECOMMENDATIONS:   5.1 cm incidental thyroid nodule with heterogeneous and enlarged thyroid. Recommend thyroid US if clinically warranted given patient age. Reference: J Am Renetta Radiol. 2015 Feb;12(2): 143-50         XR CHEST PORTABLE   Final Result   1. Right upper lobe airspace disease has the appearance of pneumonia, with   atelectasis or pneumonia in the right base and left upper lobe   2. Cardiomegaly with pulmonary vascular congestion but no definite findings   of pulmonary edema   3. Large thyroid goiter                 Assessment/Plan:    Active Hospital Problems    Diagnosis    Thrombocytopenia (HCC) [D69.6]    Normal anion gap metabolic acidosis [C36.2]    Fall [W19. XXXA]    Abnormal LFTs [R94.5]    Elevated brain natriuretic peptide (BNP) level [R79.89]    Elevated troponin [R77.8]    Non-ischemic cardiomyopathy (HCC) [I42.8]    Hyperbilirubinemia [E80.6]    Acute respiratory failure with hypoxia (HCC) [J96.01]    Chronic anticoagulation [Z79.01]    Supratherapeutic INR [R79.1]    Hyperthyroidism [E05.90]    LBBB (left bundle branch block) [I44.7]    KIERSTEN (acute kidney injury) ([facility] [address] [N17.9]    Chronic atrial fibrillation (HCC) [I48.20]    Severe mitral regurgitation [I34.0]    Chronic systolic (congestive) heart failure (HCC) [I50.22]    Acute on chronic systolic heart failure (HCC) [I50.23]    Stage 3b chronic kidney disease [N18.32]     1. Admitted with acute on chronic systolic CHF, severe MR, chronic A. fib on anticoagulation, s/p supratherapeutic INR. 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  Patient has previously declined any invasive intervention  Cardiology consult appreciated.   Advised on palliative approach  2.  Acute respiratory failure possible pneumonia pulmonary consulted. Currently on Vapotherm, Covid was ruled out, antibiotic for possible bacterial pneumonia  3. Hypertension currently beta-blocker, Entresto on hold. Blood pressure is soft  4. Acute kidney injury on chronic kidney disease stage IIIb Lasix infusion Entresto on hold nephrology consulted, following, poor prognosis. 5.  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 6.  Abnormal liver function tests status post cholecystectomy for acute cholecystitis 2014, will follow. Possibly due to passive congestion, consulted GI. Plan for MRI of the abdomen but unable to do as patient is on a high flow oxygen. Worsening liver function most probably secondary to shock t.  7-low blood pressure/cardiogenic shock-secondary to severe CHF    DVT Prophylaxis: Coumadin   Diet: Dietary Nutrition Supplements: Standard High Calorie Oral Supplement  DIET NO SALT ADDED (3-4 GM);   Code Status: Limited    PT/OT Eval Status:     Dispo -possible hospice today , awaiting for family's decision  Discussed with palliative care     Scarlet Shah MD

## 2021-04-06 NOTE — PROGRESS NOTES
INPATIENT PULMONARY CRITICAL CARE PROGRESS NOTE      Reason for visit: Acute respiratory failure, pulmonary edema/volume overload, CKD with KIERSTEN    SUBJECTIVE: Patient when seen this morning continues to be on Vapotherm oxygenation and patient was on 40 L oxygen flow rate in 100% oxygen to maintain saturation of 90 to 91% on the monitor, patient was not having any profound increase in work of breathing, patient was having intermittent tachypnea, patient continues to be on IV Lasix infusion when seen, patient had no fever, patient's blood pressure was borderline, patient was in atrial fibrillation with controlled ventricular rate on the monitor, patient has had borderline urine output overnight with cumulative fluid balance of -3.4 L, patient's glycemic control was acceptable, no other pertinent review of system of concern     Physical Exam:  Blood pressure 99/71, pulse 88, temperature 98 °F (36.7 °C), temperature source Oral, resp. rate 22, height 5' 8\" (1.727 m), weight 168 lb 3.2 oz (76.3 kg), SpO2 90 %.'   Constitutional: Frail and elderly, on Vapotherm, in mild respiratory distress. HENT:  Oropharynx is clear and dry. Dry mucosa, no obvious lesions   Eyes:  Conjunctivae are normal. No scleral icterus. Neck:  No JVD. Cardiovascular: s1s2 irregularly irregular , distant heart sounds. No lower extremity edema. Pulmonary/Chest: No wheezes. Few scattered rales. No accessory muscle usage or stridor. Abdominal:  Deferred. Musculoskeletal: No cyanosis. No clubbing. No obvious joint deformity. Lymphadenopathy: Deferred. Skin: Skin is warm and dry. No rash or nodules on the exposed extremities. Psychiatric: Normal mood and affect. Behavior is normal.  No anxiety. Neurologic: Awake but mildly confused, slightly garbled speech.   No obvious focal deficit, detailed exam not performed      Results:  CBC:   Recent Labs     04/03/21  0519 04/04/21  0511 04/05/21  0453   WBC 13.8* 13.5* 12.2*   HGB 15.0 14.4 14.8   HCT 44.9 42.9 43.7   .4* 103.8* 104.6*    198 126*     BMP:   Recent Labs     04/03/21 0519 04/04/21 0511 04/05/21  0453    142 136   K 4.4 4.0 3.8    108 103   CO2 21 20* 20*   BUN 97* 110* 119*   CREATININE 3.6* 3.4* 3.4*     LIVER PROFILE:   Recent Labs     04/03/21 0519 04/04/21 0511 04/05/21  0453   * 179* 124*   * 130* 112*   BILITOT 1.4* 1.7* 1.5*   ALKPHOS 221* 259* 279*     PT/INR:   Recent Labs     04/03/21 0519 04/04/21 0511 04/05/21 0453   PROTIME 98.2* 21.3* 22.6*   INR 8.29* 1.83* 1.94*       Imaging:  I have reviewed radiology images personally. XR CHEST PORTABLE   Final Result   No significant change in multifocal airspace opacities. XR CHEST PORTABLE   Final Result   Worsening multifocal airspace opacities and new pleural effusions. CT CHEST ABDOMEN PELVIS WO CONTRAST   Final Result   1. No evidence of acute traumatic injury in the chest, abdomen or pelvis. 2. Extensive bilateral ground-glass opacities in both lungs. Imaging   features can be seen with COVID-19 pneumonia, though are nonspecific and can   occur with a variety of infectious and noninfectious processes. PneInd   3. Cardiomegaly with coronary artery disease. 4. Small bilateral pleural effusions. 5. Multinodular thyroid goiter with asymmetric enlargement of the left   thyroid and rightward shift of the trachea. Thyroid nodules measure up to   5.1 cm. Please see follow-up recommendations below. RECOMMENDATIONS:   5.1 cm incidental thyroid nodule with heterogeneous and enlarged thyroid. Recommend thyroid US if clinically warranted given patient age. Reference: J Am Renetta Radiol. 2015 Feb;12(2): 143-50         XR CHEST PORTABLE   Final Result   1. Right upper lobe airspace disease has the appearance of pneumonia, with   atelectasis or pneumonia in the right base and left upper lobe   2.  Cardiomegaly with pulmonary vascular congestion but no definite findings   of pulmonary edema   3. Large thyroid goiter           Results for Matilda Brown (MRN [id number]) as of 4/5/2021 21:29   Ref.  Range 4/4/2021 05:11 4/4/2021 06:22 4/4/2021 12:51 4/5/2021 04:53   Sodium Latest Ref Range: 136 - 145 mmol/L 142   136   Potassium Latest Ref Range: 3.5 - 5.1 mmol/L 4.0   3.8   Chloride Latest Ref Range: 99 - 110 mmol/L 108   103   CO2 Latest Ref Range: 21 - 32 mmol/L 20 (L)   20 (L)   BUN Latest Ref Range: 7 - 20 mg/dL 110 (HH)   119 (HH)   Creatinine Latest Ref Range: 0.8 - 1.3 mg/dL 3.4 (H)   3.4 (H)   Anion Gap Latest Ref Range: 3 - 16  14   13   GFR Non- Latest Ref Range: >60  17 (A)   17 (A)   GFR  Latest Ref Range: >60  21 (A)   21 (A)   Magnesium Latest Ref Range: 1.80 - 2.40 mg/dL 2.90 (H)   2.90 (H)   Glucose Latest Ref Range: 70 - 99 mg/dL 114 (H)   112 (H)   Calcium Latest Ref Range: 8.3 - 10.6 mg/dL 8.2 (L)   8.1 (L)   Total Protein Latest Ref Range: 6.4 - 8.2 g/dL 5.4 (L)   5.3 (L)   Albumin Latest Ref Range: 3.4 - 5.0 g/dL 2.5 (L)   2.4 (L)   Globulin Latest Units: g/dL 2.9   2.9   Albumin/Globulin Ratio Latest Ref Range: 1.1 - 2.2  0.9 (L)   0.8 (L)   Alk Phos Latest Ref Range: 40 - 129 U/L 259 (H)   279 (H)   ALT Latest Ref Range: 10 - 40 U/L 130 (H)   112 (H)   AST Latest Ref Range: 15 - 37 U/L 179 (H)   124 (H)   Bilirubin Latest Ref Range: 0.0 - 1.0 mg/dL 1.7 (H)   1.5 (H)   TSH Reflex FT4 Latest Ref Range: 0.27 - 4.20 uIU/mL   0.68    WBC Latest Ref Range: 4.0 - 11.0 K/uL 13.5 (H)   12.2 (H)   RBC Latest Ref Range: 4.20 - 5.90 M/uL 4.14 (L)   4.17 (L)   Hemoglobin Quant Latest Ref Range: 13.5 - 17.5 g/dL 14.4   14.8   Hematocrit Latest Ref Range: 40.5 - 52.5 % 42.9   43.7   MCV Latest Ref Range: 80.0 - 100.0 fL 103.8 (H)   104.6 (H)   MCH Latest Ref Range: 26.0 - 34.0 pg 34.8 (H)   35.4 (H)   MCHC Latest Ref Range: 31.0 - 36.0 g/dL 33.5   33.9   MPV Latest Ref Range: 5.0 - 10.5 fL 9.3   9.2   RDW Latest Ref Range: 12.4 - 15.4 % 13.8   13.6   Platelet Count Latest Ref Range: 135 - 450 K/uL 198   126 (L)   Neutrophils % Latest Units: % 89.3   88.3   Lymphocyte % Latest Units: % 5.7   6.0   Monocytes % Latest Units: % 4.8   5.5     Results for Kevin Heller (MRN [id number]) as of 4/5/2021 21:29   Ref. Range 4/1/2021 05:33 4/2/2021 04:58 4/3/2021 05:19 4/4/2021 05:11 4/5/2021 04:53   WBC Latest Ref Range: 4.0 - 11.0 K/uL 13.7 (H) 14.4 (H) 13.8 (H) 13.5 (H) 12.2 (H)   RBC Latest Ref Range: 4.20 - 5.90 M/uL 4.24 4.23 4.26 4.14 (L) 4.17 (L)   Hemoglobin Quant Latest Ref Range: 13.5 - 17.5 g/dL 14.8 14.7 15.0 14.4 14.8   Hematocrit Latest Ref Range: 40.5 - 52.5 % 44.7 44.6 44.9 42.9 43.7   MCV Latest Ref Range: 80.0 - 100.0 fL 105.5 (H) 105.3 (H) 105.4 (H) 103.8 (H) 104.6 (H)   MCH Latest Ref Range: 26.0 - 34.0 pg 34.8 (H) 34.7 (H) 35.2 (H) 34.8 (H) 35.4 (H)   MCHC Latest Ref Range: 31.0 - 36.0 g/dL 33.0 32.9 33.4 33.5 33.9   MPV Latest Ref Range: 5.0 - 10.5 fL 9.5 9.4 9.3 9.3 9.2   RDW Latest Ref Range: 12.4 - 15.4 % 14.2 14.1 13.8 13.8 13.6   Platelet Count Latest Ref Range: 135 - 450 K/uL 202 206 197 198 126 (L)   Neutrophils % Latest Units: % 84.0 86.7 86.9 89.3 88.3   Lymphocyte % Latest Units: % 6.7 5.8 7.3 5.7 6.0   Monocytes % Latest Units: % 8.9 7.4 5.6 4.8 5.5   Eosinophils % Latest Units: % 0.1 0.0 0.1 0.1 0.2   Basophils % Latest Units: % 0.3 0.1 0.1 0.1 0.0     Results for Kevin Heller (MRN [id number]) as of 4/5/2021 21:31   Ref. Range 4/1/2021 05:33 4/2/2021 04:58 4/3/2021 05:19 4/4/2021 05:11 4/5/2021 04:53   Prothrombin Time Latest Ref Range: 10.0 - 13.2 sec 49.6 (H) 73.6 (H) 98.2 (H) 21.3 (H) 22.6 (H)   INR Latest Ref Range: 0.86 - 1.14  4.21 (H) 6.23 (HH) 8.29 (HH) 1.83 (H) 1.94 (H)     CT-  1. No evidence of acute traumatic injury in the chest, abdomen or pelvis.    2. Extensive bilateral ground-glass opacities in both lungs.  Imaging   features can be seen with COVID-19 pneumonia, though are nonspecific and can   occur with a variety of infectious and noninfectious processes. PneInd   3. Cardiomegaly with coronary artery disease. 4. Small bilateral pleural effusions. 5. Multinodular thyroid goiter with asymmetric enlargement of the left   thyroid and rightward shift of the trachea.  Thyroid nodules measure up to   5.1 cm.  Please see follow-up recommendations below. Summary   Limited only f/u for LVEF and mitral regurgitation. The left ventricular systolic function is severely reduced with an ejection   fraction of 20 - 25 %. There is hypokinesis of the anterior, anteroseptum, apex, apical lateral,   apical septum, inferioseptum, inferior and apical anterior walls. Moderate mitral regurgitation. Assessment and plan:  Acute respiratory failure, hypoxemic. On Vapotherm  Acute pulmonary edema. CXR probably represents bilateral edema, less likely pneumonia. On Lasix drip. He does have poor LVEF and severe MR. Infection appears less likely. CXR not improved in spite of diuresis  Renal function still on lower side  CKD/KIERSTEN. /NAG On Lasix drip with improved urine output, but worsening BUN and creatinine. ? Bicarb supplementation   Severe MR, acute on chronic systolic CHF. Cardiology following  Atrial fibrillation on chronic anticoagulation, supratherapeutic INR. Cardiology/EP following. Amiodarone held, echocardiogram with EF 20 to 25%, moderate MR  Elevated troponin. Probably related to renal insufficiency  Leukocytosis. Unclear whether he has an infection such as pneumonia. Procalcitonin not significantly elevated  Abnormal LFT. Possibly congestive  Macrocytosis. Per IM, normal B12 and folate levels  HTN, HLD, hyperthyroidism, vitamin D deficiency. Per IM. DVT prophylaxis. INR elevated.       Case d/w nursing   Patient is overall long-term prognosis appears to be guarded  Patient is a limited code now         Electronically signed by:  Winifred Frye MD    4/5/2021    9:28 PM.

## 2021-04-07 LAB — F-ACTIN AB IGG: 13 UNITS (ref 0–19)

## 2023-11-16 NOTE — H&P
Hospital Medicine History & Physical      Patient:  Evy Velasco  :   1932  MRN:   [id number]  Date of Service: 21    CHIEF COMPLAINT:  Fall, dyspnea    HISTORY OF PRESENT ILLNESS:    Evy Velasco is a 80 y.o. male. He presents from home via ambulance. Upon arrival he relates he activated EMS because he fell this morning. This is his only complaint. He denies loss of consciousness and injury. Patient has a longstanding history of severe LV systolic dysfunction, severe mitral insufficiency, and chronic atrial fibrillation for which he is anticoagulated on warfarin. He has followed up with cardiologist, Dr. Vivian Neal, through [facility] since 2017. He has declined any interventions such as mitral annuloplasty. Tonight I asked the patient about his chronic medical problems and he was completely unaware of any heart failure or valvular disease. He does not remember declining operative intervention either. He knows he sees Dr. Vivian Neal. He does know he takes warfarin for atrial fibrillation. He is prescribed furosemide to take on a prn basis and he replies that he's never heard of the drug. I spoke with his daughter later and she related he had a bottle of it in his medicine cabinet. Denies loss of appetite, rigors, sweats, nausea, and diarrhea. Denies dyspnea, orthopnea, PND. He denies chest pressure. He does relate he has not urinated all day. Has also says he has received two doses of a coronavirus vaccine. I spoke with the patient's daughter, Margaret Bosch. Her number is [phone]. She relates that normally his memory is quite clear and is surprised to hear that he can't remember his own medical history or medications. We also discussed code status (see separate note). Review of Systems:  All pertinent positives and negatives are as noted in the HPI section. All other systems were reviewed and are negative.     Past Medical History:   Diagnosis Date    Allergic rhinitis     Atrial fibrillation (HCC)     CHF (congestive heart failure) (HCC)     Chronic kidney disease, stage III (moderate)     Hyperlipidemia     Hypertension     Macular degeneration     Thyroid disease     Vitamin D deficiency        Past Surgical History:   Procedure Laterality Date    CARDIOVERSION  2006    atrial fib    COLONOSCOPY  2013    EYE SURGERY  2000    archie iol    JOINT REPLACEMENT Left 2012    hip    JOINT REPLACEMENT Right 2004    hip         Prior to Admission medications    Medication Sig Start Date End Date Taking? Authorizing Provider   amiodarone (CORDARONE) 200 MG tablet Take 200 mg by mouth daily. Yes Historical Provider, MD   atorvastatin (LIPITOR) 20 MG tablet Take 20 mg by mouth daily. Yes Historical Provider, MD   warfarin (COUMADIN) 2.5 MG tablet Take 2.5 mg by mouth Daily. 5mg  2days   Yes Historical Provider, MD   Misc Natural Products (OSTEO BI-FLEX ADV TRIPLE ST PO) Take  by mouth daily. Yes Historical Provider, MD   psyllium (KONSYL) 28.3 % PACK Take 1 packet by mouth daily. Yes Historical Provider, MD   vitamin B-12 (CYANOCOBALAMIN) 1000 MCG tablet Take 1,000 mcg by mouth daily. Historical Provider, MD   furosemide (LASIX) 40 MG tablet Take 40 mg by mouth 2 times daily. Historical Provider, MD   calcitRIOL (ROCALTROL) 0.25 MCG capsule Take 0.25 mcg by mouth daily. Historical Provider, MD   lisinopril (PRINIVIL;ZESTRIL) 20 MG tablet Take 20 mg by mouth every evening. Historical Provider, MD   vitamin D (ERGOCALCIFEROL) 26633 UNITS CAPS capsule Take 50,000 Units by mouth See Admin Instructions. 2x monthly    Historical Provider, MD   Multiple Vitamins-Minerals (OCUVITE PO) Take  by mouth daily. Historical Provider, MD   methimazole (TAPAZOLE) 10 MG tablet Take 10 mg by mouth 3 times daily. Historical Provider, MD       Allergies:   Patient has no known allergies. Social:   reports that he has never smoked.  He has never used smokeless bilaterally  no active wound or ulcer. LABS:  Lab Results   Component Value Date    WBC 14.4 (H) 03/31/2021    HGB 15.0 03/31/2021    HCT 44.3 03/31/2021    .2 (H) 03/31/2021     03/31/2021     Lab Results   Component Value Date    CREATININE 2.3 (H) 03/31/2021    BUN 52 (H) 03/31/2021     03/31/2021    K 4.6 03/31/2021     03/31/2021    CO2 20 (L) 03/31/2021     Lab Results   Component Value Date    ALT 37 03/31/2021    AST 46 (H) 03/31/2021    ALKPHOS 237 (H) 03/31/2021    BILITOT 2.9 (H) 03/31/2021     Lab Results   Component Value Date    CKTOTAL 188 03/31/2021    TROPONINI 0.04 (H) 03/31/2021     No results for input(s): PHART, LAN8FWP, PO2ART in the last 72 hours. IMAGING:  Xr Chest Portable    Result Date: 3/31/2021  EXAMINATION: ONE XRAY VIEW OF THE CHEST 3/31/2021 9:03 pm COMPARISON: 10/30/2011 HISTORY: ORDERING SYSTEM PROVIDED HISTORY: shortness of breath TECHNOLOGIST PROVIDED HISTORY: Reason for exam:->shortness of breath Reason for Exam: sob Acuity: Acute Type of Exam: Initial FINDINGS: Cardiomegaly. Prominent central pulmonary vessels. Large thoracic inlet mass displacing the trachea to the right. Airspace disease in the right upper lobe. Strandy opacity in the right base. Strandy opacity in the left upper lobe. Costophrenic angles sharp     1. Right upper lobe airspace disease has the appearance of pneumonia, with atelectasis or pneumonia in the right base and left upper lobe 2. Cardiomegaly with pulmonary vascular congestion but no definite findings of pulmonary edema 3. Large thyroid goiter     I directly reviewed all recent imaging studies as well as pertinent prior studies. Radiology reports may or may not be available at the time of my review. EKG:  New and pertinent prior tracings were directly reviewed. My interpretation is as follows:  Atrial fibrillation with LBBB    [facility] 11/2/2020  Summary:  The left atrium is severely dilated.   The left ventricle is mildly dilated. Wall thickness at upper limits of normal.  Atrial fibrillation with a ventricular rate of 102 beats per minute. Overall left ventricular ejection fraction is estimated to be 25-30%. The left ventricular function is severely reduced. There is moderate to severe global hypokinesis of the left ventricle. There is an abnormal contraction sequence due to intraventricular conduction  defect. The Aortic Valve leaflets appear sclerotic. Mild - moderate tricuspid regurgitation is present. Mild to moderate pulmonic valvular regurgitation. The right ventricular systolic function is mildly reduced. The right atrium is mildly dilated. There is moderate to severe mitral regurgitation. The mitral regurgitant jet is anteriorly directed, which is consistent with  posterior leaflet pathology. Trace aortic regurgitation. The mitral valve leaflets are mildly myxomatous in appearance. There is mild mitral valve prolapse. LV thrombus can not be excluded. The pulmonary artery is not well visualized. Active Hospital Problems    Diagnosis Date Noted    Hyperbilirubinemia [E80.6] 04/01/2021    Acute respiratory failure with hypoxia (HCC) [J96.01] 04/01/2021    Chronic anticoagulation [Z79.01] 04/01/2021    Supratherapeutic INR [R79.1] 04/01/2021    Hyperthyroidism [E05.90] 04/01/2021    LBBB (left bundle branch block) [I44.7] 04/01/2021    KIERSTEN (acute kidney injury) ([facility] [address] [N17.9] 03/31/2021    Chronic atrial fibrillation (HCC) [I48.20] 03/31/2021    Severe mitral regurgitation [I34.0] 03/31/2021    Chronic systolic (congestive) heart failure (HCC) [I50.22] 03/31/2021    Acute on chronic systolic heart failure (HCC) [I50.23] 03/31/2021    Stage 3b chronic kidney disease [N18.32] 01/11/2014       ASSESSMENT & PLAN  Acute on chronic systolic heart failure,  Severe MR, Chronic atrial fibrillation, anticoagulation, supratherapeutic INR  -  Patient is apparently not taking furosemide.   It As certified below, I, or a nurse practitioner or physician assistant working with me, had a face-to-face encounter that meets the physician face-to-face encounter requirements. is prescribed prn. He cannot recall ever having taken this medication. Some degree of cognitive impairment seems to be laying a role is his current decompensation.  -  Start with furosemide 40mg IV BID. Hold entresto. -  Continue amiodarone and atorvastatin. -  Continue warfarin at recuced dose of 2.5 qpm and monitor PT/INR daily.  -  See outside echo report above. 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 Patient has previously declined any invasive interventions. Acute Respiratory failure, Possible pneumonia  -  Titrate respiratory support according to patient's needs and advanced care plan. Currently patient is requiring 11 L/min O2 support. At baseline he does not require O2 supplementation. He would not want to be intubated. -  Atypical appearing bilateral upper lobe ground glass findings on chest CT noted. Acute pulmonary edema was suspected, but could not exclude pneumonia. Procalcitonin was added to labs and returned 0.47. Will check a follow up level for trend and for now continue empiric abx, ceftriaxone and azithromycin.  -  COVID-19 NAAT was negatve in the ER. He has been vaccinated. Suspicion is low. KIERSTEN on CKD 3b  -  Baseline creatinine ~ 1.8. Currently 2.3.  -  I suspect urinary retention. Place ragland catheter.    -  Start furosemide 40mg IV BID. Hold entresto. 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 -  Continue methimazole 10mg TID. Check TSH and FT4. Hyperbilirubinemia  -  Associated w/ mild elevation of alkaline phosphatase as well. This was present on prior labs in 2014 but not on labs obtained since then at [address] [place] [address]. He underwent cholecystectomy for acute cholecystitis in 2014. Bilirubin level returned to normal as recently as November, 2019.  -  Suspected congestive hepatopathy. No imaging findings to suggest biliary obstruction.     DVT prophylaxis: SCDs, warfarin  Stress ulcers:  Start pantoprazole and continue indefinitely to reduce long term GI bleeding risk. Code Status:  LIMITED x 4  Disposition:  Inpatient. Eventual d/c likely to home w/ home health care.     Morocho Catherine DA SILVA